# Patient Record
Sex: FEMALE | Race: BLACK OR AFRICAN AMERICAN | Employment: UNEMPLOYED | URBAN - METROPOLITAN AREA
[De-identification: names, ages, dates, MRNs, and addresses within clinical notes are randomized per-mention and may not be internally consistent; named-entity substitution may affect disease eponyms.]

---

## 2017-06-02 ENCOUNTER — HOSPITAL ENCOUNTER (EMERGENCY)
Facility: HOSPITAL | Age: 37
Discharge: HOME | End: 2017-06-02
Attending: EMERGENCY MEDICINE
Payer: MEDICAID

## 2017-06-02 VITALS
HEART RATE: 78 BPM | RESPIRATION RATE: 18 BRPM | BODY MASS INDEX: 34.86 KG/M2 | DIASTOLIC BLOOD PRESSURE: 71 MMHG | OXYGEN SATURATION: 98 % | SYSTOLIC BLOOD PRESSURE: 111 MMHG | TEMPERATURE: 98 F | HEIGHT: 68 IN | WEIGHT: 230 LBS

## 2017-06-02 DIAGNOSIS — G43.009 MIGRAINE WITHOUT AURA AND WITHOUT STATUS MIGRAINOSUS, NOT INTRACTABLE: Primary | ICD-10-CM

## 2017-06-02 PROCEDURE — 80076 HEPATIC FUNCTION PANEL: CPT | Performed by: EMERGENCY MEDICINE

## 2017-06-02 PROCEDURE — 96375 TX/PRO/DX INJ NEW DRUG ADDON: CPT

## 2017-06-02 PROCEDURE — 82962 GLUCOSE BLOOD TEST: CPT

## 2017-06-02 PROCEDURE — 96374 THER/PROPH/DIAG INJ IV PUSH: CPT

## 2017-06-02 PROCEDURE — 84443 ASSAY THYROID STIM HORMONE: CPT | Performed by: EMERGENCY MEDICINE

## 2017-06-02 PROCEDURE — 83690 ASSAY OF LIPASE: CPT | Performed by: EMERGENCY MEDICINE

## 2017-06-02 PROCEDURE — 80048 BASIC METABOLIC PNL TOTAL CA: CPT | Performed by: EMERGENCY MEDICINE

## 2017-06-02 PROCEDURE — 81003 URINALYSIS AUTO W/O SCOPE: CPT | Performed by: EMERGENCY MEDICINE

## 2017-06-02 PROCEDURE — 85025 COMPLETE CBC W/AUTO DIFF WBC: CPT | Performed by: EMERGENCY MEDICINE

## 2017-06-02 PROCEDURE — 81025 URINE PREGNANCY TEST: CPT

## 2017-06-02 PROCEDURE — 96361 HYDRATE IV INFUSION ADD-ON: CPT

## 2017-06-02 PROCEDURE — 99284 EMERGENCY DEPT VISIT MOD MDM: CPT

## 2017-06-02 RX ORDER — METOCLOPRAMIDE HYDROCHLORIDE 5 MG/ML
10 INJECTION INTRAMUSCULAR; INTRAVENOUS ONCE
Status: COMPLETED | OUTPATIENT
Start: 2017-06-02 | End: 2017-06-02

## 2017-06-02 RX ORDER — KETOROLAC TROMETHAMINE 30 MG/ML
30 INJECTION, SOLUTION INTRAMUSCULAR; INTRAVENOUS ONCE
Status: COMPLETED | OUTPATIENT
Start: 2017-06-02 | End: 2017-06-02

## 2017-06-02 RX ORDER — DIPHENHYDRAMINE HYDROCHLORIDE 50 MG/ML
25 INJECTION INTRAMUSCULAR; INTRAVENOUS ONCE
Status: COMPLETED | OUTPATIENT
Start: 2017-06-02 | End: 2017-06-02

## 2017-06-02 RX ORDER — ONDANSETRON 4 MG/1
4 TABLET, ORALLY DISINTEGRATING ORAL EVERY 4 HOURS PRN
Qty: 10 TABLET | Refills: 0 | Status: SHIPPED | OUTPATIENT
Start: 2017-06-02 | End: 2017-06-09

## 2017-06-02 NOTE — ED INITIAL ASSESSMENT (HPI)
C/o \"bad\" headaches x 1 month with fatigue, nausea and vomiting. Sts she feels bloated as well. At times has numbness and tingling to bilateral feet. Here today because nausea continues.

## 2017-06-02 NOTE — ED PROVIDER NOTES
Patient Seen in: [facility] AND [facility] Emergency Department    History   Patient presents with:  Nausea/Vomiting/Diarrhea (gastrointestinal)    Stated Complaint: nvd    HPI    29year old female with h/o pre-diabetes, h/o ovarian cyst, h/o migraines, depress Resp 06/02/17 0954 20   Temp 06/02/17 0954 98.3 °F (36.8 °C)   Temp src 06/02/17 0954 Oral   SpO2 06/02/17 0954 99 %   O2 Device 06/02/17 1046 None (Room air)       Current:/71 mmHg  Pulse 78  Temp(Src) 98.3 °F (36.8 °C) (Oral)  Resp 18  Ht 172.7 c URINALYSIS WITH CULTURE REFLEX - Abnormal; Notable for the following:     Clarity Urine Hazy (*)     Ascorbic Acid Urine 40  (*)     RBC URINE 5 (*)     All other components within normal limits   BASIC METABOLIC PANEL (8) - Abnormal; Notable for the fol her to f/u outpatient for further testing due to her family history. Pt given return precautions, otherwise well appearing.        Disposition and Plan     Clinical Impression:  Migraine without aura and without status migrainosus, not intractable  (primary

## 2017-12-12 ENCOUNTER — OFFICE VISIT (OUTPATIENT)
Dept: OBGYN CLINIC | Facility: CLINIC | Age: 37
End: 2017-12-12

## 2017-12-12 ENCOUNTER — APPOINTMENT (OUTPATIENT)
Dept: LAB | Facility: REFERENCE LAB | Age: 37
End: 2017-12-12
Attending: OBSTETRICS & GYNECOLOGY
Payer: MEDICAID

## 2017-12-12 VITALS
WEIGHT: 242 LBS | HEIGHT: 68 IN | BODY MASS INDEX: 36.68 KG/M2 | DIASTOLIC BLOOD PRESSURE: 70 MMHG | SYSTOLIC BLOOD PRESSURE: 106 MMHG

## 2017-12-12 DIAGNOSIS — F32.A DEPRESSION, UNSPECIFIED DEPRESSION TYPE: ICD-10-CM

## 2017-12-12 DIAGNOSIS — Z01.419 ENCOUNTER FOR GYNECOLOGICAL EXAMINATION WITHOUT ABNORMAL FINDING: Primary | ICD-10-CM

## 2017-12-12 PROCEDURE — 85027 COMPLETE CBC AUTOMATED: CPT | Performed by: OBSTETRICS & GYNECOLOGY

## 2017-12-12 PROCEDURE — 36415 COLL VENOUS BLD VENIPUNCTURE: CPT | Performed by: OBSTETRICS & GYNECOLOGY

## 2017-12-12 PROCEDURE — 84443 ASSAY THYROID STIM HORMONE: CPT | Performed by: OBSTETRICS & GYNECOLOGY

## 2017-12-12 PROCEDURE — 80053 COMPREHEN METABOLIC PANEL: CPT | Performed by: OBSTETRICS & GYNECOLOGY

## 2017-12-12 PROCEDURE — 85652 RBC SED RATE AUTOMATED: CPT | Performed by: OBSTETRICS & GYNECOLOGY

## 2017-12-12 PROCEDURE — 99385 PREV VISIT NEW AGE 18-39: CPT | Performed by: OBSTETRICS & GYNECOLOGY

## 2017-12-12 RX ORDER — SERTRALINE HYDROCHLORIDE 100 MG/1
TABLET, FILM COATED ORAL
Refills: 3 | COMMUNITY
Start: 2017-12-06 | End: 2018-07-25

## 2017-12-12 RX ORDER — SUMATRIPTAN 50 MG/1
TABLET, FILM COATED ORAL
Refills: 2 | COMMUNITY
Start: 2017-12-07 | End: 2018-07-25

## 2017-12-12 RX ORDER — CLONAZEPAM 0.5 MG/1
TABLET ORAL
Refills: 0 | COMMUNITY
Start: 2017-12-08 | End: 2018-07-25

## 2017-12-12 NOTE — PROGRESS NOTES
GYN H&P     2017  2:51 PM    CC: Patient is here for annual and IUD removal.     HPI: Patient is a 40year old  for above. She is not sexually active and would like Mirena removed.     + h/o recurrent boils in her vulvar area (chronic).  None c • Diabetes Father    • High Blood Pressure Father    • No Known Problems Sister    • No Known Problems Brother    • Glaucoma Maternal Grandmother    • No Known Problems Maternal Grandfather    • No Known Problems Paternal Grandmother    • No Known Proble day  2. Feeling down, depressed, or hopeless: Nearly every day  3. Trouble falling or staying asleep, or sleeping too much: Nearly every day  4. Feeling tired or having little energy: Nearly every day  5. Poor appetite or overeating: Nearly every day  6.  Shaheen Kirk Depression, unspecified depression type  - TSH W REFLEX TO FREE T4; Future  - CBC, PLATELET; NO DIFFERENTIAL; Future  - SED RATE, WESTERGREN (AUTOMATED)  - COMP METABOLIC PANEL (14);  Future  - [facility] - INTERNAL    I dw pt at length that

## 2017-12-14 PROBLEM — R87.612 PAPANICOLAOU SMEAR OF CERVIX WITH LOW GRADE SQUAMOUS INTRAEPITHELIAL LESION (LGSIL): Status: ACTIVE | Noted: 2017-12-14

## 2017-12-14 PROBLEM — F32.A DEPRESSION: Status: ACTIVE | Noted: 2017-12-14

## 2018-01-11 ENCOUNTER — OFFICE VISIT (OUTPATIENT)
Dept: OBGYN CLINIC | Facility: CLINIC | Age: 38
End: 2018-01-11

## 2018-01-11 VITALS — WEIGHT: 241 LBS | BODY MASS INDEX: 36.53 KG/M2 | HEIGHT: 68 IN

## 2018-01-11 DIAGNOSIS — R87.612 PAPANICOLAOU SMEAR OF CERVIX WITH LOW GRADE SQUAMOUS INTRAEPITHELIAL LESION (LGSIL): Primary | ICD-10-CM

## 2018-01-11 DIAGNOSIS — N76.0 BACTERIAL VAGINOSIS: ICD-10-CM

## 2018-01-11 DIAGNOSIS — B96.89 BACTERIAL VAGINOSIS: ICD-10-CM

## 2018-01-11 LAB
CONTROL LINE PRESENT WITH A CLEAR BACKGROUND (YES/NO): YES YES/NO
KIT EXPIRATION DATE: NORMAL DATE
PREGNANCY TEST, URINE: NEGATIVE

## 2018-01-11 PROCEDURE — 81025 URINE PREGNANCY TEST: CPT | Performed by: OBSTETRICS & GYNECOLOGY

## 2018-01-11 PROCEDURE — 57456 ENDOCERV CURETTAGE W/SCOPE: CPT | Performed by: OBSTETRICS & GYNECOLOGY

## 2018-01-11 PROCEDURE — 88305 TISSUE EXAM BY PATHOLOGIST: CPT | Performed by: OBSTETRICS & GYNECOLOGY

## 2018-01-11 RX ORDER — METRONIDAZOLE 500 MG/1
500 TABLET ORAL 2 TIMES DAILY
Qty: 14 TABLET | Refills: 0 | Status: SHIPPED | OUTPATIENT
Start: 2018-01-11 | End: 2018-01-18

## 2018-01-16 ENCOUNTER — CHARTING TRANS (OUTPATIENT)
Dept: OTHER | Age: 38
End: 2018-01-16

## 2018-07-25 ENCOUNTER — APPOINTMENT (OUTPATIENT)
Dept: LAB | Facility: REFERENCE LAB | Age: 38
End: 2018-07-25
Attending: FAMILY MEDICINE
Payer: COMMERCIAL

## 2018-07-25 DIAGNOSIS — E55.9 VITAMIN D DEFICIENCY: ICD-10-CM

## 2018-07-25 DIAGNOSIS — E53.8 VITAMIN B12 DEFICIENCY: ICD-10-CM

## 2018-07-25 PROBLEM — G43.009 MIGRAINE WITHOUT AURA: Status: ACTIVE | Noted: 2018-07-25

## 2018-07-25 PROBLEM — B35.1 ONYCHOMYCOSIS: Status: ACTIVE | Noted: 2018-07-25

## 2018-07-25 PROBLEM — F41.9 ANXIETY: Status: ACTIVE | Noted: 2018-07-25

## 2018-07-25 PROBLEM — F32.2 MODERATELY SEVERE MAJOR DEPRESSION (HCC): Status: ACTIVE | Noted: 2018-07-25

## 2018-07-25 LAB — VIT B12 SERPL-MCNC: 1212 PG/ML (ref 181–914)

## 2018-07-25 PROCEDURE — 82607 VITAMIN B-12: CPT | Performed by: FAMILY MEDICINE

## 2018-07-25 PROCEDURE — 82306 VITAMIN D 25 HYDROXY: CPT | Performed by: FAMILY MEDICINE

## 2018-07-25 PROCEDURE — 36415 COLL VENOUS BLD VENIPUNCTURE: CPT | Performed by: FAMILY MEDICINE

## 2018-07-25 NOTE — PATIENT INSTRUCTIONS
Preventing Migraine Headaches: Triggers  The first step in preventing migraines is to learn what triggers them. You may then be able to control your triggers to avoid or reduce the severity of your migraines.   Know your triggers  Be aware that you may ha © 8062-3733 [address]. [address] [facility] [place]. All rights reserved. This information is not intended as a substitute for professional medical care. Always follow your healthcare professional's instructions.

## 2018-07-25 NOTE — PROGRESS NOTES
CC:  Patient presents with:  Establish Care  Lab: vitamin d def and to check vitamin levels  Fungus Nails: left big toe fell off and on her right middle finger  Referral: for counseling for depression  Migraine: discuss options for treatment  Referral: PT with no radicular symptoms   Psych: depression and anxiety but no mercedez, no SI or HI     Past Medical History:   Diagnosis Date   • Depression     most of her life, no suicidal ideation.  No psychiatrist   • Migraine    • Ovarian cyst     2009 was evaluated muscular tenderness to palpation. Full range of neck motion with negative Spurling sign. Negative straight leg raise testing and normal strength bilateral upper and lower extremities.  Normal sensation throughout  Psych: normal mood and affect  Neuro: CN II anxiety/depression or sooner as needed.      Skye Dinh DO  07/25/18  4:27 PM

## 2018-07-27 LAB — 25(OH)D3 SERPL-MCNC: 24.4 NG/ML

## 2018-07-28 DIAGNOSIS — E55.9 VITAMIN D DEFICIENCY: Primary | ICD-10-CM

## 2018-08-08 ENCOUNTER — LAB ENCOUNTER (OUTPATIENT)
Dept: LAB | Facility: REFERENCE LAB | Age: 38
End: 2018-08-08
Attending: FAMILY MEDICINE
Payer: COMMERCIAL

## 2018-08-08 ENCOUNTER — OFFICE VISIT (OUTPATIENT)
Dept: FAMILY MEDICINE CLINIC | Facility: CLINIC | Age: 38
End: 2018-08-08
Payer: COMMERCIAL

## 2018-08-08 VITALS
BODY MASS INDEX: 36.53 KG/M2 | WEIGHT: 241 LBS | HEART RATE: 73 BPM | SYSTOLIC BLOOD PRESSURE: 118 MMHG | DIASTOLIC BLOOD PRESSURE: 72 MMHG | HEIGHT: 68 IN | OXYGEN SATURATION: 98 %

## 2018-08-08 DIAGNOSIS — F32.2 MODERATELY SEVERE MAJOR DEPRESSION (HCC): ICD-10-CM

## 2018-08-08 DIAGNOSIS — Z00.01 ENCOUNTER FOR ROUTINE ADULT HEALTH EXAMINATION WITH ABNORMAL FINDINGS: Primary | ICD-10-CM

## 2018-08-08 DIAGNOSIS — E55.9 VITAMIN D DEFICIENCY: ICD-10-CM

## 2018-08-08 DIAGNOSIS — Z30.432 ENCOUNTER FOR REMOVAL OF INTRAUTERINE CONTRACEPTIVE DEVICE: ICD-10-CM

## 2018-08-08 DIAGNOSIS — B36.0 TINEA VERSICOLOR: ICD-10-CM

## 2018-08-08 DIAGNOSIS — F41.9 ANXIETY: ICD-10-CM

## 2018-08-08 DIAGNOSIS — Z00.01 ENCOUNTER FOR ROUTINE ADULT HEALTH EXAMINATION WITH ABNORMAL FINDINGS: ICD-10-CM

## 2018-08-08 DIAGNOSIS — Z11.3 VENEREAL DISEASE SCREENING: ICD-10-CM

## 2018-08-08 DIAGNOSIS — Z80.3 FAMILY HISTORY OF BREAST CANCER: ICD-10-CM

## 2018-08-08 PROBLEM — F32.A DEPRESSION: Status: RESOLVED | Noted: 2017-12-14 | Resolved: 2018-08-08

## 2018-08-08 LAB
CHOLEST SERPL-MCNC: 158 MG/DL (ref 110–200)
HDLC SERPL-MCNC: 48 MG/DL
LDLC SERPL CALC-MCNC: 98 MG/DL (ref 0–99)
NONHDLC SERPL-MCNC: 110 MG/DL
TRIGL SERPL-MCNC: 59 MG/DL (ref 1–149)

## 2018-08-08 PROCEDURE — 87491 CHLMYD TRACH DNA AMP PROBE: CPT | Performed by: FAMILY MEDICINE

## 2018-08-08 PROCEDURE — 87389 HIV-1 AG W/HIV-1&-2 AB AG IA: CPT | Performed by: FAMILY MEDICINE

## 2018-08-08 PROCEDURE — 83036 HEMOGLOBIN GLYCOSYLATED A1C: CPT | Performed by: FAMILY MEDICINE

## 2018-08-08 PROCEDURE — 87591 N.GONORRHOEAE DNA AMP PROB: CPT | Performed by: FAMILY MEDICINE

## 2018-08-08 PROCEDURE — 36415 COLL VENOUS BLD VENIPUNCTURE: CPT | Performed by: FAMILY MEDICINE

## 2018-08-08 PROCEDURE — 80061 LIPID PANEL: CPT | Performed by: FAMILY MEDICINE

## 2018-08-08 PROCEDURE — 99395 PREV VISIT EST AGE 18-39: CPT | Performed by: FAMILY MEDICINE

## 2018-08-08 PROCEDURE — 86780 TREPONEMA PALLIDUM: CPT | Performed by: FAMILY MEDICINE

## 2018-08-08 PROCEDURE — 58301 REMOVE INTRAUTERINE DEVICE: CPT | Performed by: FAMILY MEDICINE

## 2018-08-08 RX ORDER — SELENIUM SULFIDE 2.5 MG/100ML
1 LOTION TOPICAL WEEKLY
Qty: 150 ML | Refills: 3 | Status: SHIPPED | OUTPATIENT
Start: 2018-08-08 | End: 2020-04-14 | Stop reason: ALTCHOICE

## 2018-08-08 RX ORDER — KETOCONAZOLE 20 MG/G
1 CREAM TOPICAL 2 TIMES DAILY
Qty: 60 G | Refills: 2 | Status: SHIPPED | OUTPATIENT
Start: 2018-08-08 | End: 2019-11-21 | Stop reason: ALTCHOICE

## 2018-08-08 RX ORDER — ESCITALOPRAM OXALATE 10 MG/1
10 TABLET ORAL DAILY
Qty: 30 TABLET | Refills: 0 | Status: SHIPPED | OUTPATIENT
Start: 2018-08-08 | End: 2018-09-07

## 2018-08-08 NOTE — PATIENT INSTRUCTIONS
Prevention Guidelines, Women Ages 25 to 44  Screening tests and vaccines are an important part of managing your health. A screening test is done to find possible disorders or diseases in people who don't have any symptoms.  The goal is to find a disease e Type 2 diabetes All women with prediabetes Every year   Gonorrhea Sexually active women at increased risk for infection At routine exams   Hepatitis C Anyone at increased risk At routine exams   HIV All women should be tested at least once for HIV between Meningococcal Women at increased risk for infection should talk with their healthcare provider 1 or more doses   Pneumococcal conjugate vaccine (PCV13) and pneumococcal polysaccharide vaccine (PPSV23) Women at increased risk for infection should talk with © 5493-6784 [address]. [address] [facility], [facility] [place]. All rights reserved. This information is not intended as a substitute for professional medical care. Always follow your healthcare professional's instructions.         Natacha ESTRELLA This fungus can come back again (recur) after treatment. To prevent return of the rash, use medicated dandruff shampoo over your whole body when in the shower. Do this once a month for the next year. This is very important to do in the summertime.  That is

## 2018-08-08 NOTE — PROGRESS NOTES
HPI:   Vonda Royal is a 40year old female who presents for a complete physical exam.   Patient presents with:  Physical  Medication Follow-Up: states the medication she was prescribe did not make a difference  IUD: Requesting removal    Has not noticed ketoconazole 2 % External Cream Apply 1 Application topically 2 (two) times daily. Disp: 60 g Rfl: 2   selenium sulfide 2.5 % External Lotion Apply 1 Application topically once a week. Apply from neck to knees and wash off after 24 hours.  Disp: 150 mL Rf kg/m².   /72   Pulse 73   Ht 68\"   Wt 241 lb   SpO2 98%   Breastfeeding?  No   BMI 36.64 kg/m²     GENERAL: well developed, well nourished, in no apparent distress   SKIN: hypopigmented macules on chest and neck with scaling but no excoriations of ce note, will use condoms for now and decide on what future birth control she prefers. Referral to genetic specialist at [place] [address] for possible BRCA testing given strong family history of breast cancer.      Discussed with patient the following:  -Monthly hilda

## 2018-08-09 PROBLEM — B36.0 TINEA VERSICOLOR: Status: ACTIVE | Noted: 2018-08-09

## 2018-08-09 LAB
C TRACH DNA SPEC QL NAA+PROBE: NEGATIVE
HBA1C MFR BLD: 5.9 % (ref 4–6)
HIV1+2 AB SERPL QL IA: NONREACTIVE
N GONORRHOEA DNA SPEC QL NAA+PROBE: NEGATIVE

## 2018-08-09 NOTE — PROCEDURES
IUD Removal   Consent signed. Procedure discussed with the patient in detail including indication, risks, benefits, alternatives and complications. Procedure:  Speculum placed in the vagina. An Endocervical speculum was used to visualize strings.   Rin

## 2018-08-10 LAB — T PALLIDUM AB SER QL: NEGATIVE

## 2018-08-13 DIAGNOSIS — R73.09 ELEVATED HEMOGLOBIN A1C: Primary | ICD-10-CM

## 2018-09-08 ENCOUNTER — PATIENT MESSAGE (OUTPATIENT)
Dept: FAMILY MEDICINE CLINIC | Facility: CLINIC | Age: 38
End: 2018-09-08

## 2018-09-08 RX ORDER — CHOLECALCIFEROL (VITAMIN D3) 1250 MCG
CAPSULE ORAL
Qty: 8 CAPSULE | Refills: 0 | OUTPATIENT
Start: 2018-09-08

## 2018-09-08 RX ORDER — ESCITALOPRAM OXALATE 10 MG/1
TABLET ORAL
Qty: 30 TABLET | Refills: 0 | Status: SHIPPED | OUTPATIENT
Start: 2018-09-08 | End: 2018-10-17

## 2018-09-10 NOTE — TELEPHONE ENCOUNTER
From: Saba Norris,   To: Jennifer London  Sent: 9/8/2018 12:36 PM CDT  Subject: Vitamin D    Jono Reina,    I did not refill your Vitamin D medication as I first want you to come back to repeat your Vitamin D level and make sure your number is now normal. If

## 2018-09-11 ENCOUNTER — TELEPHONE (OUTPATIENT)
Dept: FAMILY MEDICINE CLINIC | Facility: CLINIC | Age: 38
End: 2018-09-11

## 2018-09-11 NOTE — TELEPHONE ENCOUNTER
Let pt know that Dr. Hany Mcgee dictated a letter for her and we can not e-mail it. Per pt, we can mail it. Sent out today.

## 2018-10-17 RX ORDER — ESCITALOPRAM OXALATE 10 MG/1
TABLET ORAL
Qty: 90 TABLET | Refills: 0 | Status: SHIPPED | OUTPATIENT
Start: 2018-10-17 | End: 2018-12-03

## 2019-01-07 ENCOUNTER — TELEPHONE (OUTPATIENT)
Dept: FAMILY MEDICINE CLINIC | Facility: CLINIC | Age: 39
End: 2019-01-07

## 2019-01-07 NOTE — TELEPHONE ENCOUNTER
Addendum to note from 1/7/19:  Per pharmacist, Clonazepam was sent in by Dr. Ellis Mcwilliams today who per pt, she hasn't seen \"in forever\" and is currently out of stock. They have it ordered for pt. Pt also is currently out of work without any health insurance.

## 2019-03-09 RX ORDER — ESCITALOPRAM OXALATE 20 MG/1
TABLET ORAL
Qty: 90 TABLET | Refills: 0 | Status: SHIPPED | OUTPATIENT
Start: 2019-03-09 | End: 2019-05-01 | Stop reason: ALTCHOICE

## 2019-03-09 NOTE — TELEPHONE ENCOUNTER
A refill request was received for:  Requested Prescriptions     Pending Prescriptions Disp Refills   • ESCITALOPRAM 20 MG Oral Tab [Pharmacy Med Name: ESCITALOPRAM 20MG TABLETS] 90 tablet 0     Sig: TAKE 1 TABLET(20 MG) BY MOUTH DAILY     Last refill date:

## 2019-05-01 NOTE — PROGRESS NOTES
CC:  Patient presents with:  Medication Follow-Up: states the hydroxizine doesnt help much for her sleep, and lexapro has helped very little       HPI: 41year old female here to follow-up on medication for anxiety and depression.   Has been taking Lexapro Highest education level: Not on file    Occupational History      Occupation: Customer service    Social Needs      Financial resource strain: Not on file      Food insecurity:        Worry: Not on file        Inability: Not on file      Transportation nee how often have you   been bothered by the following problems? Not at all Several days More than half the days Nearly every day Total Score   1. Feeling nervous, anxious or on edge 0 1 2 3 1   2. Not being able to stop or control worrying 0 1 2 3 2   3.  Wor kg/m².     Physical:  General:  Alert, appropriate, no acute distress   HEENT: supple, no tonsillar erythema or exudate, no lymphadenopathy, no thyroid enlargement   Cardio:  RRR, no murmurs, S1, S2  Pulmonary:  Clear bilaterally, good air entry  Psych: nor

## 2019-05-01 NOTE — PATIENT INSTRUCTIONS
-Take Escitalopram 10 mg (cut pill in half) daily for 3 days, then take it every other day for 4 days, then stop  -After stopping Escitalopram, do not take your Escitalopram or Fluoxetine for 1 day, then start new Fluoxetine 10 mg daily for 7 days  -Once t

## 2019-05-03 ENCOUNTER — TELEPHONE (OUTPATIENT)
Dept: OBGYN CLINIC | Facility: CLINIC | Age: 39
End: 2019-05-03

## 2019-05-06 ENCOUNTER — TELEPHONE (OUTPATIENT)
Dept: FAMILY MEDICINE CLINIC | Facility: CLINIC | Age: 39
End: 2019-05-06

## 2019-05-06 NOTE — TELEPHONE ENCOUNTER
Pt rescheduled [facility] visit. Pt needs prior authorization for medication Fluoxetine. This RN will route to M. Severo Obey MA.

## 2019-05-14 ENCOUNTER — OFFICE VISIT (OUTPATIENT)
Dept: OBGYN CLINIC | Facility: CLINIC | Age: 39
End: 2019-05-14
Payer: MEDICAID

## 2019-05-14 VITALS
HEIGHT: 68 IN | DIASTOLIC BLOOD PRESSURE: 80 MMHG | WEIGHT: 257.38 LBS | BODY MASS INDEX: 39.01 KG/M2 | SYSTOLIC BLOOD PRESSURE: 112 MMHG

## 2019-05-14 DIAGNOSIS — Z01.419 ENCOUNTER FOR GYNECOLOGICAL EXAMINATION WITHOUT ABNORMAL FINDING: Primary | ICD-10-CM

## 2019-05-14 RX ORDER — FLUOXETINE HYDROCHLORIDE 20 MG/1
CAPSULE ORAL
Qty: 90 CAPSULE | Refills: 0 | Status: SHIPPED | OUTPATIENT
Start: 2019-05-14 | End: 2019-07-25

## 2019-05-14 RX ORDER — FLUOXETINE 10 MG/1
10 CAPSULE ORAL DAILY
Qty: 7 CAPSULE | Refills: 0 | Status: SHIPPED | OUTPATIENT
Start: 2019-05-14 | End: 2019-06-14

## 2019-05-30 RX ORDER — TRAZODONE HYDROCHLORIDE 50 MG/1
TABLET ORAL
Qty: 30 TABLET | Refills: 0 | Status: SHIPPED | OUTPATIENT
Start: 2019-05-30 | End: 2019-07-25

## 2019-05-30 NOTE — TELEPHONE ENCOUNTER
Pt calling scheduled appointment with Dr. Adarsh Grimm, asking if we can refill trazadone. Telephone Encounter by Ashanti Payan NCMA at 07/17/18 01:22 PM     Author:  Ashanti Payan NCMA Service:  (none) Author Type:  Certified Medical Assistant     Filed:  07/17/18 01:23 PM Encounter Date:  7/17/2018 Status:  Signed     :  Ashanti Payan NCMA (Certified Medical Assistant)            Spoke with patient. Scheduled to see Rossy Sourav 7/20/18 at 3:00pm. [facility].[JF1.1M]       Revision History        User Key Date/Time User Provider Type Action    > JF1.1 07/17/18 01:23 PM Ashanti Payan NCMA Certified Medical Assistant Sign    M - Manual

## 2019-05-30 NOTE — TELEPHONE ENCOUNTER
Refill request:    traZODone HCl 50 MG Oral Tab 30 tablet 0 5/1/2019    Sig:   Take 1 tablet (50 mg total) by mouth nightly.        Next appt 6/19/2019    LOV 5/1/2019

## 2019-06-14 ENCOUNTER — APPOINTMENT (OUTPATIENT)
Dept: LAB | Facility: REFERENCE LAB | Age: 39
End: 2019-06-14
Attending: OBSTETRICS & GYNECOLOGY
Payer: COMMERCIAL

## 2019-06-14 ENCOUNTER — OFFICE VISIT (OUTPATIENT)
Dept: OBGYN CLINIC | Facility: CLINIC | Age: 39
End: 2019-06-14
Payer: MEDICAID

## 2019-06-14 VITALS
WEIGHT: 249 LBS | DIASTOLIC BLOOD PRESSURE: 70 MMHG | SYSTOLIC BLOOD PRESSURE: 106 MMHG | HEIGHT: 68 IN | BODY MASS INDEX: 37.74 KG/M2

## 2019-06-14 DIAGNOSIS — Z01.419 ENCOUNTER FOR GYNECOLOGICAL EXAMINATION WITHOUT ABNORMAL FINDING: ICD-10-CM

## 2019-06-14 DIAGNOSIS — Z30.09 FAMILY PLANNING: ICD-10-CM

## 2019-06-14 DIAGNOSIS — R87.612 PAPANICOLAOU SMEAR OF CERVIX WITH LOW GRADE SQUAMOUS INTRAEPITHELIAL LESION (LGSIL): ICD-10-CM

## 2019-06-14 DIAGNOSIS — Z01.419 ENCOUNTER FOR GYNECOLOGICAL EXAMINATION WITHOUT ABNORMAL FINDING: Primary | ICD-10-CM

## 2019-06-14 DIAGNOSIS — L68.0 HIRSUTISM: ICD-10-CM

## 2019-06-14 DIAGNOSIS — Z80.3 FAMILY HISTORY OF BREAST CANCER: ICD-10-CM

## 2019-06-14 PROCEDURE — 84443 ASSAY THYROID STIM HORMONE: CPT

## 2019-06-14 PROCEDURE — 82627 DEHYDROEPIANDROSTERONE: CPT

## 2019-06-14 PROCEDURE — 84402 ASSAY OF FREE TESTOSTERONE: CPT

## 2019-06-14 PROCEDURE — 83498 ASY HYDROXYPROGESTERONE 17-D: CPT | Performed by: OBSTETRICS & GYNECOLOGY

## 2019-06-14 PROCEDURE — 99395 PREV VISIT EST AGE 18-39: CPT | Performed by: OBSTETRICS & GYNECOLOGY

## 2019-06-14 PROCEDURE — 87591 N.GONORRHOEAE DNA AMP PROB: CPT | Performed by: OBSTETRICS & GYNECOLOGY

## 2019-06-14 PROCEDURE — 84403 ASSAY OF TOTAL TESTOSTERONE: CPT

## 2019-06-14 PROCEDURE — 87624 HPV HI-RISK TYP POOLED RSLT: CPT | Performed by: OBSTETRICS & GYNECOLOGY

## 2019-06-14 PROCEDURE — 88175 CYTOPATH C/V AUTO FLUID REDO: CPT | Performed by: OBSTETRICS & GYNECOLOGY

## 2019-06-14 PROCEDURE — 87491 CHLMYD TRACH DNA AMP PROBE: CPT | Performed by: OBSTETRICS & GYNECOLOGY

## 2019-06-14 PROCEDURE — 36415 COLL VENOUS BLD VENIPUNCTURE: CPT

## 2019-06-14 NOTE — PROGRESS NOTES
GYN H&P     2019  9:08 AM    CC: Patient is here for annual.     HPI: Patient is a 41year old  for annual. She has noticed for the past year that she has had increasing hair under chin     She had IUD removed after 7 years.  She is not currentl Blood Pressure Father    • No Known Problems Sister    • No Known Problems Brother    • Glaucoma Maternal Grandmother    • No Known Problems Maternal Grandfather    • No Known Problems Paternal Grandmother    • No Known Problems Paternal Grandfather    • B masses, no discharge  Vagina: normal pink mucosa, no lesions, normal clear discharge.    Uterus: midline, mobile, non-tender, firm and smooth  Cervix: pink, no lesions grossly visible, no discharge  Adnexa: non tender, no palpable masses, normal size  Anus:

## 2019-06-20 NOTE — PROGRESS NOTES
Reviewed results with pt and scheduled for Thursday 8/1 for colpo. Due to provider and pt's schedules this was the earliest time pt could come in.  This RN will review with LC that it is okay for pt to wait this long, otherwise she will look into requestin

## 2019-07-23 ENCOUNTER — APPOINTMENT (OUTPATIENT)
Dept: GENERAL RADIOLOGY | Facility: HOSPITAL | Age: 39
End: 2019-07-23
Attending: EMERGENCY MEDICINE
Payer: COMMERCIAL

## 2019-07-23 ENCOUNTER — HOSPITAL ENCOUNTER (EMERGENCY)
Facility: HOSPITAL | Age: 39
Discharge: HOME | End: 2019-07-24
Attending: EMERGENCY MEDICINE
Payer: COMMERCIAL

## 2019-07-23 DIAGNOSIS — S16.1XXA STRAIN OF NECK MUSCLE, INITIAL ENCOUNTER: Primary | ICD-10-CM

## 2019-07-23 DIAGNOSIS — T07.XXXA MULTIPLE CONTUSIONS: ICD-10-CM
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
## 2019-07-23 PROCEDURE — 99284 EMERGENCY DEPT VISIT MOD MDM: CPT

## 2019-07-23 PROCEDURE — 71046 X-RAY EXAM CHEST 2 VIEWS: CPT | Performed by: EMERGENCY MEDICINE

## 2019-07-23 PROCEDURE — 72040 X-RAY EXAM NECK SPINE 2-3 VW: CPT | Performed by: EMERGENCY MEDICINE

## 2019-07-23 RX ORDER — IBUPROFEN 600 MG/1
600 TABLET ORAL ONCE
Status: COMPLETED | OUTPATIENT
Start: 2019-07-23 | End: 2019-07-23

## 2019-07-24 VITALS
WEIGHT: 248.88 LBS | DIASTOLIC BLOOD PRESSURE: 79 MMHG | RESPIRATION RATE: 18 BRPM | BODY MASS INDEX: 37.72 KG/M2 | OXYGEN SATURATION: 96 % | TEMPERATURE: 99 F | HEART RATE: 64 BPM | SYSTOLIC BLOOD PRESSURE: 140 MMHG | HEIGHT: 68 IN
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
## 2019-07-25 ENCOUNTER — TELEPHONE (OUTPATIENT)
Dept: FAMILY MEDICINE CLINIC | Facility: CLINIC | Age: 39
End: 2019-07-25

## 2019-07-25 ENCOUNTER — OFFICE VISIT (OUTPATIENT)
Dept: FAMILY MEDICINE CLINIC | Facility: CLINIC | Age: 39
End: 2019-07-25
Payer: MEDICAID

## 2019-07-25 VITALS
DIASTOLIC BLOOD PRESSURE: 70 MMHG | HEIGHT: 68 IN | HEART RATE: 87 BPM | WEIGHT: 247.63 LBS | BODY MASS INDEX: 37.53 KG/M2 | OXYGEN SATURATION: 98 % | SYSTOLIC BLOOD PRESSURE: 110 MMHG
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
## 2019-07-25 DIAGNOSIS — F33.41 RECURRENT MAJOR DEPRESSIVE DISORDER, IN PARTIAL REMISSION (HCC): ICD-10-CM

## 2019-07-25 DIAGNOSIS — M25.511 ACUTE PAIN OF RIGHT SHOULDER: ICD-10-CM

## 2019-07-25 DIAGNOSIS — F41.9 ANXIETY: ICD-10-CM

## 2019-07-25 DIAGNOSIS — S16.1XXD STRAIN OF NECK MUSCLE, SUBSEQUENT ENCOUNTER: ICD-10-CM

## 2019-07-25 PROCEDURE — 99214 OFFICE O/P EST MOD 30 MIN: CPT | Performed by: FAMILY MEDICINE

## 2019-07-25 RX ORDER — DICLOFENAC SODIUM 75 MG/1
75 TABLET, DELAYED RELEASE ORAL 2 TIMES DAILY PRN
COMMUNITY
Start: 2019-06-20 | End: 2019-11-21 | Stop reason: ALTCHOICE

## 2019-07-25 RX ORDER — FLUOXETINE HYDROCHLORIDE 20 MG/1
CAPSULE ORAL
Qty: 90 CAPSULE | Refills: 1 | Status: SHIPPED | OUTPATIENT
Start: 2019-07-25 | End: 2019-07-25

## 2019-07-25 RX ORDER — FAMOTIDINE 20 MG
1 TABLET ORAL DAILY
COMMUNITY
End: 2020-05-11 | Stop reason: ALTCHOICE

## 2019-07-25 RX ORDER — NAPROXEN 500 MG/1
500 TABLET ORAL 2 TIMES DAILY WITH MEALS
Qty: 28 TABLET | Refills: 0 | Status: SHIPPED | OUTPATIENT
Start: 2019-07-25 | End: 2019-08-15

## 2019-07-25 RX ORDER — TRAZODONE HYDROCHLORIDE 50 MG/1
TABLET ORAL
Qty: 90 TABLET | Refills: 0 | Status: SHIPPED | OUTPATIENT
Start: 2019-07-25 | End: 2020-04-14

## 2019-07-25 RX ORDER — FLUOXETINE HYDROCHLORIDE 20 MG/1
CAPSULE ORAL
Qty: 90 CAPSULE | Refills: 1 | Status: SHIPPED | OUTPATIENT
Start: 2019-07-25 | End: 2019-08-22

## 2019-07-25 NOTE — TELEPHONE ENCOUNTER
[facility] calling to clarify an order for Fluoxetine. The Order states:    FLUoxetine HCl 20 MG Oral Cap 90 capsule 1 7/25/2019    Sig:   Take 10 mg by mouth daily and then increase to 20 mg capsules.        Per AS note from today:  \"Doing very
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
## 2019-07-25 NOTE — PATIENT INSTRUCTIONS
Understanding Cervical Strain    There are 7 bones (vertebrae) in the neck that are part of the spine. These are called the cervical spine. Cervical strain is a medical term for neck pain. The neck has several layers of muscles.  These are connected with · New symptoms  Date Last Reviewed: 3/10/2016  © 3364-9151 [address]. 1407 [facility], [facility]2 [place]. All rights reserved. This information is not intended as a substitute for professional medical care.  Always follow your healthc

## 2019-07-30 ENCOUNTER — TELEPHONE (OUTPATIENT)
Dept: OBGYN CLINIC | Facility: CLINIC | Age: 39
End: 2019-07-30

## 2019-08-01 ENCOUNTER — TELEPHONE (OUTPATIENT)
Dept: ORTHOPEDICS CLINIC | Facility: CLINIC | Age: 39
End: 2019-08-01

## 2019-08-01 ENCOUNTER — HOSPITAL ENCOUNTER (OUTPATIENT)
Age: 39
Discharge: HOME | End: 2019-08-01
Attending: FAMILY MEDICINE
Payer: COMMERCIAL

## 2019-08-01 ENCOUNTER — OFFICE VISIT (OUTPATIENT)
Dept: OBGYN CLINIC | Facility: CLINIC | Age: 39
End: 2019-08-01
Payer: MEDICAID

## 2019-08-01 ENCOUNTER — APPOINTMENT (OUTPATIENT)
Dept: GENERAL RADIOLOGY | Age: 39
End: 2019-08-01
Attending: FAMILY MEDICINE
Payer: COMMERCIAL

## 2019-08-01 ENCOUNTER — TELEPHONE (OUTPATIENT)
Dept: FAMILY MEDICINE CLINIC | Facility: CLINIC | Age: 39
End: 2019-08-01

## 2019-08-01 VITALS
HEIGHT: 68 IN | DIASTOLIC BLOOD PRESSURE: 80 MMHG | WEIGHT: 249 LBS | SYSTOLIC BLOOD PRESSURE: 116 MMHG | BODY MASS INDEX: 37.74 KG/M2

## 2019-08-01 VITALS
SYSTOLIC BLOOD PRESSURE: 137 MMHG | RESPIRATION RATE: 18 BRPM | HEART RATE: 80 BPM | BODY MASS INDEX: 36.37 KG/M2 | TEMPERATURE: 99 F | HEIGHT: 68 IN | DIASTOLIC BLOOD PRESSURE: 86 MMHG | WEIGHT: 240 LBS | OXYGEN SATURATION: 100 %

## 2019-08-01 DIAGNOSIS — R42 DIZZINESS DUE TO OLD HEAD TRAUMA: Primary | ICD-10-CM

## 2019-08-01 DIAGNOSIS — M25.461 EFFUSION OF RIGHT KNEE: Primary | ICD-10-CM

## 2019-08-01 DIAGNOSIS — R87.612 PAPANICOLAOU SMEAR OF CERVIX WITH LOW GRADE SQUAMOUS INTRAEPITHELIAL LESION (LGSIL): Primary | ICD-10-CM

## 2019-08-01 DIAGNOSIS — Z80.3 FAMILY HISTORY OF BREAST CANCER: ICD-10-CM

## 2019-08-01 DIAGNOSIS — R42 DIZZINESS: ICD-10-CM

## 2019-08-01 DIAGNOSIS — S09.90XS DIZZINESS DUE TO OLD HEAD TRAUMA: Primary | ICD-10-CM

## 2019-08-01 PROCEDURE — 88305 TISSUE EXAM BY PATHOLOGIST: CPT | Performed by: OBSTETRICS & GYNECOLOGY

## 2019-08-01 PROCEDURE — 57454 BX/CURETT OF CERVIX W/SCOPE: CPT | Performed by: OBSTETRICS & GYNECOLOGY

## 2019-08-01 PROCEDURE — 99213 OFFICE O/P EST LOW 20 MIN: CPT

## 2019-08-01 PROCEDURE — 73562 X-RAY EXAM OF KNEE 3: CPT | Performed by: FAMILY MEDICINE
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
## 2019-08-01 NOTE — TELEPHONE ENCOUNTER
Dr. Tracey Vidal, this pt was seen in [facility] today and xrays were taken. Based on xrays and IC exam, can this pt wait to be seen by Hayley Choe on 08/09/19? He has a full schedule on 08/06/19 and you are out of the office next week.  Do you recommend pt going to
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
## 2019-08-01 NOTE — TELEPHONE ENCOUNTER
AS will put in stat MRI order; pt will call [facility] scheduling to schedule test; pt to call back tomorrow morning with date and time of test so that [facility] ER referral specialist can get it authorized. ER precautions reviewed with pt.  Pt verbalized unders
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
## 2019-08-01 NOTE — TELEPHONE ENCOUNTER
It can wait, but if she wants to be seen soon, can call [facility] and get an appointment, provided she has insurance we accept.

## 2019-08-02 ENCOUNTER — TELEPHONE (OUTPATIENT)
Dept: FAMILY MEDICINE CLINIC | Facility: CLINIC | Age: 39
End: 2019-08-02

## 2019-08-02 NOTE — TELEPHONE ENCOUNTER
Called emani to get stat prior auth on MRI Brain w/ wo contrast summited additional clinicals as prior auth was sent to medical review, asked to do a peer to peer and was told that current status is in initial determination and is waiting on medical Dr lyon

## 2019-08-02 NOTE — TELEPHONE ENCOUNTER
Pt feeling the same; still feeling \"whoozie\". ER precautions reviewed with pt if symptoms persist, change, get worse. Pt verbalized understanding and agrees with POC. Pt to be updated on MRI order when we hear back from insurance.

## 2019-08-02 NOTE — TELEPHONE ENCOUNTER
Leticia Giraldo Son, [place]      8/2/19 11:54 AM   Note      Called emani to get stat prior auth on MRI Brain w/ wo contrast summited additional clinicals as prior auth was sent to medical review, asked to do a peer to peer and was told that current status is in i

## 2019-08-02 NOTE — TELEPHONE ENCOUNTER
STAT MRI placed (ok per AS) as pt states she can't get MRI done today except in [place] which is too far for pt. Pt aware that it needs to be authorized by pt's insurance. Pt to be called once approved. Pt verbalized understanding and agrees with POC.

## 2019-08-04 ENCOUNTER — HOSPITAL ENCOUNTER (OUTPATIENT)
Dept: MRI IMAGING | Age: 39
Discharge: HOME | End: 2019-08-04
Attending: FAMILY MEDICINE
Payer: COMMERCIAL

## 2019-08-04 ENCOUNTER — PATIENT MESSAGE (OUTPATIENT)
Dept: FAMILY MEDICINE CLINIC | Facility: CLINIC | Age: 39
End: 2019-08-04

## 2019-08-04 DIAGNOSIS — R42 DIZZINESS DUE TO OLD HEAD TRAUMA: ICD-10-CM

## 2019-08-04 DIAGNOSIS — S09.90XS DIZZINESS DUE TO OLD HEAD TRAUMA: ICD-10-CM

## 2019-08-04 DIAGNOSIS — S06.0X0D CONCUSSION WITHOUT LOSS OF CONSCIOUSNESS, SUBSEQUENT ENCOUNTER: Primary | ICD-10-CM

## 2019-08-04 PROCEDURE — 70553 MRI BRAIN STEM W/O & W/DYE: CPT | Performed by: FAMILY MEDICINE

## 2019-08-04 PROCEDURE — A9575 INJ GADOTERATE MEGLUMI 0.1ML: HCPCS | Performed by: FAMILY MEDICINE

## 2019-08-05 ENCOUNTER — TELEPHONE (OUTPATIENT)
Dept: PHYSICAL THERAPY | Facility: HOSPITAL | Age: 39
End: 2019-08-05
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
## 2019-08-06 ENCOUNTER — TELEPHONE (OUTPATIENT)
Dept: PHYSICAL THERAPY | Facility: HOSPITAL | Age: 39
End: 2019-08-06

## 2019-08-07 ENCOUNTER — TELEPHONE (OUTPATIENT)
Dept: OBGYN CLINIC | Facility: CLINIC | Age: 39
End: 2019-08-07

## 2019-08-07 NOTE — PROGRESS NOTES
Maryalice Siemens, MD  P [facility] Clinical Staff         Called and dw pt these results. 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 DW pt procedure and told her that triage nurse would call her to schedule this.

## 2019-08-08 ENCOUNTER — OFFICE VISIT (OUTPATIENT)
Dept: PHYSICAL THERAPY | Facility: HOSPITAL | Age: 39
End: 2019-08-08
Attending: FAMILY MEDICINE
Payer: COMMERCIAL

## 2019-08-08 DIAGNOSIS — S06.0X0D CONCUSSION WITHOUT LOSS OF CONSCIOUSNESS, SUBSEQUENT ENCOUNTER: ICD-10-CM

## 2019-08-08 PROCEDURE — 97163 PT EVAL HIGH COMPLEX 45 MIN: CPT
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
## 2019-08-15 ENCOUNTER — OFFICE VISIT (OUTPATIENT)
Dept: PHYSICAL THERAPY | Facility: HOSPITAL | Age: 39
End: 2019-08-15
Attending: FAMILY MEDICINE
Payer: COMMERCIAL

## 2019-08-15 PROCEDURE — 97530 THERAPEUTIC ACTIVITIES: CPT

## 2019-08-15 PROCEDURE — 97112 NEUROMUSCULAR REEDUCATION: CPT

## 2019-08-15 RX ORDER — NAPROXEN 500 MG/1
TABLET ORAL
Qty: 28 TABLET | Refills: 0 | Status: SHIPPED | OUTPATIENT
Start: 2019-08-15 | End: 2019-11-21 | Stop reason: ALTCHOICE

## 2019-08-15 NOTE — PATIENT INSTRUCTIONS
Do these 2 times each day    1. Walk and turn your head side to side to look at the walls as if window shopping. Turn head every 4-5 steps and try to maintain a straight path. Do 10 head turns. Repeat with up and down head turns.     2. Stand tall with t
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
## 2019-08-15 NOTE — TELEPHONE ENCOUNTER
A refill request was received for:  Requested Prescriptions     Pending Prescriptions Disp Refills   • NAPROXEN 500 MG Oral Tab [Pharmacy Med Name: NAPROXEN 500MG TABLETS] 28 tablet 0     Sig: TAKE 1 TABLET(500 MG) BY MOUTH TWICE DAILY WITH MEALS FOR 14 DA

## 2019-08-19 ENCOUNTER — TELEPHONE (OUTPATIENT)
Dept: PHYSICAL THERAPY | Facility: HOSPITAL | Age: 39
End: 2019-08-19

## 2019-08-21 ENCOUNTER — OFFICE VISIT (OUTPATIENT)
Dept: PHYSICAL THERAPY | Facility: HOSPITAL | Age: 39
End: 2019-08-21
Attending: FAMILY MEDICINE
Payer: COMMERCIAL

## 2019-08-21 PROCEDURE — 97530 THERAPEUTIC ACTIVITIES: CPT

## 2019-08-21 PROCEDURE — 97112 NEUROMUSCULAR REEDUCATION: CPT
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
## 2019-08-28 ENCOUNTER — OFFICE VISIT (OUTPATIENT)
Dept: PHYSICAL THERAPY | Facility: HOSPITAL | Age: 39
End: 2019-08-28
Attending: FAMILY MEDICINE
Payer: COMMERCIAL

## 2019-08-28 PROCEDURE — 97112 NEUROMUSCULAR REEDUCATION: CPT
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
## 2019-08-28 NOTE — PATIENT INSTRUCTIONS
Do these 2 times each day    1. Walk and turn your head side to side to look at the walls as if window shopping. Turn head every 4-5 steps and try to maintain a straight path. Do 10 head turns. Repeat with up and down head turns.     2. Stand tall with t or in front of your bed for safety. Stand tall, arms at sides. Turn head and shoulders to the right, looking over your shoulder. Return to the center and repeat to the left. Do 5 times each direction.

## 2019-08-30 ENCOUNTER — APPOINTMENT (OUTPATIENT)
Dept: PHYSICAL THERAPY | Facility: HOSPITAL | Age: 39
End: 2019-08-30
Attending: FAMILY MEDICINE
Payer: COMMERCIAL

## 2019-09-04 ENCOUNTER — TELEPHONE (OUTPATIENT)
Dept: PHYSICAL THERAPY | Facility: HOSPITAL | Age: 39
End: 2019-09-04

## 2019-09-05 ENCOUNTER — OFFICE VISIT (OUTPATIENT)
Dept: OBGYN CLINIC | Facility: CLINIC | Age: 39
End: 2019-09-05
Payer: COMMERCIAL

## 2019-09-05 VITALS — BODY MASS INDEX: 36 KG/M2 | HEIGHT: 68 IN

## 2019-09-05 DIAGNOSIS — R87.612 PAPANICOLAOU SMEAR OF CERVIX WITH LOW GRADE SQUAMOUS INTRAEPITHELIAL LESION (LGSIL): Primary | ICD-10-CM

## 2019-09-05 LAB
CONTROL LINE PRESENT WITH A CLEAR BACKGROUND (YES/NO): YES YES/NO
KIT LOT #: NORMAL NUMERIC
PREGNANCY TEST, URINE: NEGATIVE

## 2019-09-05 PROCEDURE — 88305 TISSUE EXAM BY PATHOLOGIST: CPT | Performed by: OBSTETRICS & GYNECOLOGY

## 2019-09-05 PROCEDURE — 88307 TISSUE EXAM BY PATHOLOGIST: CPT | Performed by: OBSTETRICS & GYNECOLOGY

## 2019-09-05 PROCEDURE — 81025 URINE PREGNANCY TEST: CPT | Performed by: OBSTETRICS & GYNECOLOGY

## 2019-09-05 PROCEDURE — 57522 CONIZATION OF CERVIX: CPT | Performed by: OBSTETRICS & GYNECOLOGY

## 2019-09-05 NOTE — PROGRESS NOTES
Pap = LGSIL    Colpo bx = LGSIL (persistent)    Here for LEEP. I dw pt the pathology results, how the LEEP is performed, and to expect some light vaginal bleeding and lower abdominal cramping.  I discussed the risks of the procedure including bleeding, inf

## 2019-09-06 ENCOUNTER — OFFICE VISIT (OUTPATIENT)
Dept: UROLOGY | Facility: HOSPITAL | Age: 39
End: 2019-09-06
Attending: OBSTETRICS & GYNECOLOGY
Payer: COMMERCIAL

## 2019-09-06 VITALS
SYSTOLIC BLOOD PRESSURE: 112 MMHG | HEIGHT: 68 IN | WEIGHT: 240 LBS | BODY MASS INDEX: 36.37 KG/M2 | DIASTOLIC BLOOD PRESSURE: 80 MMHG

## 2019-09-06 DIAGNOSIS — R35.1 NOCTURIA: ICD-10-CM

## 2019-09-06 DIAGNOSIS — N39.44 NOCTURNAL ENURESIS: Primary | ICD-10-CM

## 2019-09-06 DIAGNOSIS — N39.41 URGE INCONTINENCE: ICD-10-CM

## 2019-09-06 DIAGNOSIS — N39.3 FEMALE STRESS INCONTINENCE: ICD-10-CM

## 2019-09-06 PROCEDURE — 99201 HC OUTPT EVAL AND MGNT NEW PT LEVEL 1: CPT

## 2019-09-06 NOTE — PROGRESS NOTES
Aurora Munoz,   9/6/2019     Referred by Dr. Shavonne Napier  Pt here with self    Patient presents with:   Incontinence      HPI:  +RAIN, some  Some UUI  Reports nocturia x3  H/o nocturnal enuresis    Denies prolapse  Not sexually active, n/a dyspareunia  Constip Allergies    Medications:    Outpatient Medications Prior to Visit:  FLUoxetine HCl 40 MG Oral Cap Take one capsule by mouth daily.  Disp: 90 capsule Rfl: 0   NAPROXEN 500 MG Oral Tab TAKE 1 TABLET(500 MG) BY MOUTH TWICE DAILY WITH MEALS FOR 14 DAYS Disp: 2 +mobile  Adnexa:no masses, nontender  Perineum: nontender  Anus: wnl  Rectum: defer    PELVIS FLOOR NEUROMUSCULAR FUNCTION:  Strength:  1, Unable to hold greater than 3 sec and Increased tone  Perineal Sensation:  Normal      PELVIC SUPPORT:  [place]:  0  Ant

## 2019-09-10 ENCOUNTER — APPOINTMENT (OUTPATIENT)
Dept: PHYSICAL THERAPY | Facility: HOSPITAL | Age: 39
End: 2019-09-10
Attending: FAMILY MEDICINE
Payer: COMMERCIAL

## 2019-09-12 ENCOUNTER — APPOINTMENT (OUTPATIENT)
Dept: PHYSICAL THERAPY | Facility: HOSPITAL | Age: 39
End: 2019-09-12
Attending: FAMILY MEDICINE
Payer: COMMERCIAL

## 2019-09-17 ENCOUNTER — OFFICE VISIT (OUTPATIENT)
Dept: PHYSICAL THERAPY | Facility: HOSPITAL | Age: 39
End: 2019-09-17
Attending: FAMILY MEDICINE
Payer: COMMERCIAL

## 2019-09-17 PROCEDURE — 97112 NEUROMUSCULAR REEDUCATION: CPT

## 2019-09-17 NOTE — PROGRESS NOTES
Discharge Summary    Referring Physician: Dr Mariela Cox    Diagnosis:  Post-concussion    Pt has attended 5 visits in Physical Therapy. Subjective: Pt reports being asymptomatic. Has not experienced dizziness or headaches. Feels back to baseline.  Able to yuval

## 2019-09-23 ENCOUNTER — APPOINTMENT (OUTPATIENT)
Dept: PHYSICAL THERAPY | Facility: HOSPITAL | Age: 39
End: 2019-09-23
Attending: FAMILY MEDICINE
Payer: COMMERCIAL

## 2019-09-26 ENCOUNTER — TELEPHONE (OUTPATIENT)
Dept: OBGYN CLINIC | Facility: CLINIC | Age: 39
End: 2019-09-26

## 2019-09-30 ENCOUNTER — APPOINTMENT (OUTPATIENT)
Dept: PHYSICAL THERAPY | Facility: HOSPITAL | Age: 39
End: 2019-09-30
Attending: FAMILY MEDICINE
Payer: COMMERCIAL

## 2019-10-08 ENCOUNTER — APPOINTMENT (OUTPATIENT)
Dept: PHYSICAL THERAPY | Facility: HOSPITAL | Age: 39
End: 2019-10-08
Attending: FAMILY MEDICINE
Payer: COMMERCIAL

## 2019-10-29 RX ORDER — FLUOXETINE HYDROCHLORIDE 40 MG/1
CAPSULE ORAL
Qty: 90 CAPSULE | Refills: 0 | Status: SHIPPED | OUTPATIENT
Start: 2019-10-29 | End: 2019-11-21

## 2019-10-29 NOTE — TELEPHONE ENCOUNTER
A refill request was received for:  Requested Prescriptions     Pending Prescriptions Disp Refills   • FLUoxetine HCl 40 MG Oral Cap 90 capsule 0     Sig: Take one capsule by mouth daily.      Last refill date: 08/22/19  Qty:90  Last office visit: 05/01/19

## 2019-11-21 NOTE — PROGRESS NOTES
CC:  Patient presents with:  Medication Follow-Up: fluoxetine seems to help but not enough      HPI: 46year old female here to follow-up on treatment of anxiety and depression with Fluoxetine.    Increased her Fluoxetine to 40 mg daily in August when work file      Transportation needs:        Medical: Not on file        Non-medical: Not on file    Tobacco Use      Smoking status: Former Smoker        Quit date: 2014        Years since quittin.9      Smokeless tobacco: Never Used    Substance and control worrying 0 1 2 3 1   3. Worrying too much about different things 0 1 2 3 1   4. Trouble relaxing 0 1 2 3 1   5. Being so restless that it is hard to sit still 0 1 2 3 0   6. Becoming easily annoyed or irritable 0 1 2 3 2   7.  Feeling afraid as if s lesions  Psych: normal mood and affect     Assessment and Plan: 46year old female here to follow-up on depression and anxiety.     1. Recurrent major depressive disorder, in partial remission ([facility] [address]    - Overall improved with PHQ9 score of 6/27, but feels s

## 2019-11-25 ENCOUNTER — OFFICE VISIT (OUTPATIENT)
Dept: OBGYN CLINIC | Facility: CLINIC | Age: 39
End: 2019-11-25
Payer: COMMERCIAL

## 2019-11-25 VITALS
SYSTOLIC BLOOD PRESSURE: 116 MMHG | DIASTOLIC BLOOD PRESSURE: 80 MMHG | HEIGHT: 68 IN | BODY MASS INDEX: 37.89 KG/M2 | WEIGHT: 250 LBS

## 2019-11-25 DIAGNOSIS — R87.612 PAPANICOLAOU SMEAR OF CERVIX WITH LOW GRADE SQUAMOUS INTRAEPITHELIAL LESION (LGSIL): Primary | ICD-10-CM

## 2019-11-25 PROCEDURE — 99024 POSTOP FOLLOW-UP VISIT: CPT | Performed by: OBSTETRICS & GYNECOLOGY

## 2019-11-25 NOTE — PROGRESS NOTES
Here for FU after [facility] for persistent alfred 1. No complaints    SPEC: well healed cone base. Plan FU pap 4/2020.

## 2020-03-20 RX ORDER — FLUOXETINE HYDROCHLORIDE 60 MG/1
TABLET, FILM COATED ORAL; ORAL
Qty: 90 TABLET | Refills: 0 | Status: SHIPPED | OUTPATIENT
Start: 2020-03-20 | End: 2020-04-14

## 2020-03-20 NOTE — TELEPHONE ENCOUNTER
A refill request was received for:  Requested Prescriptions     Pending Prescriptions Disp Refills   • FLUOXETINE HCL 60 MG Oral Tab [Pharmacy Med Name: FLUOXETINE 60MG TABLETS] 90 tablet 0     Sig: TAKE 1 TABLET BY MOUTH DAILY     Last refill date: 11/21/

## 2020-04-13 ENCOUNTER — PATIENT MESSAGE (OUTPATIENT)
Dept: FAMILY MEDICINE CLINIC | Facility: CLINIC | Age: 40
End: 2020-04-13

## 2020-04-14 PROBLEM — F33.2 SEVERE EPISODE OF RECURRENT MAJOR DEPRESSIVE DISORDER, WITHOUT PSYCHOTIC FEATURES (HCC): Status: ACTIVE | Noted: 2018-07-25

## 2020-04-14 NOTE — PATIENT INSTRUCTIONS
Depression  Depression is one of the most common mental health problems today. It is not just a state of unhappiness or sadness. It is a true disease. The cause seems to be related to a decrease in chemicals that transmit signals in the brain.  Having a f · Don't drink alcohol, which can make depression worse. · Take medicine as prescribed.   · Tell each of your healthcare providers about all of the prescription and over-the-counter medicines, vitamins, and supplements you take. Certain supplements interact

## 2020-04-14 NOTE — PROGRESS NOTES
CC:  Patient presents with: Anxiety      HPI: 46year old female here for video visit for anxiety and depression follow-up. Has been at home for the past 6 weeks as she was not happy with the conditions at her job at [employer].   Per her mother, she has been resource strain: Not on file      Food insecurity:        Worry: Not on file        Inability: Not on file      Transportation needs:        Medical: Not on file        Non-medical: Not on file    Tobacco Use      Smoking status: Former Smoker        Quit day Total Score   1. Feeling nervous, anxious or on edge 0 1 2 3 3   2. Not being able to stop or control worrying 0 1 2 3 3   3. Worrying too much about different things 0 1 2 3 3   4. Trouble relaxing 0 1 2 3 3   5.  Being so restless that it is hard to s has greatly increased to 23/27, but denies any active suicidal plans or attempts  - Provided with [facility] crisis line and also referred to [facility] to provide CBT resources  - Discussed switching to alternative medication for depression and anxiety

## 2020-04-14 NOTE — TELEPHONE ENCOUNTER
Called pt and also needs anxiety meds, provided appointment for today at 1630. Also pt has  13 days of Fluoxetine hcl 60mg po left and need rx to be sent to [facility] Rx mail in order. Unable to find specific company therefore encouraged to send picture.  Pt

## 2020-04-17 ENCOUNTER — TELEPHONE (OUTPATIENT)
Dept: FAMILY MEDICINE CLINIC | Facility: CLINIC | Age: 40
End: 2020-04-17

## 2020-04-17 NOTE — TELEPHONE ENCOUNTER
Khushi calling from [facility] requesting an call back to discuss patient.

## 2020-04-20 NOTE — TELEPHONE ENCOUNTER
Received following message from [facility]:   \"The patient was assessed on 4/17/20 and deemed appropriate for Partial Hospitalization Program - [address] states she would like to start program, but would like to first contact her insurance c

## 2020-05-11 PROBLEM — F41.1 GENERALIZED ANXIETY DISORDER: Status: ACTIVE | Noted: 2020-05-11

## 2020-05-11 PROBLEM — F41.0 PANIC ATTACK: Status: ACTIVE | Noted: 2020-05-11

## 2020-06-09 RX ORDER — BUPROPION HYDROCHLORIDE 150 MG/1
150 TABLET ORAL EVERY MORNING
Qty: 90 TABLET | Refills: 1 | Status: SHIPPED | OUTPATIENT
Start: 2020-06-09 | End: 2021-01-27

## 2020-06-09 RX ORDER — TRAZODONE HYDROCHLORIDE 100 MG/1
100 TABLET ORAL NIGHTLY PRN
Qty: 90 TABLET | Refills: 1 | Status: SHIPPED | OUTPATIENT
Start: 2020-06-09 | End: 2021-01-29 | Stop reason: ALTCHOICE

## 2020-06-09 RX ORDER — FLUOXETINE 20 MG/1
TABLET, FILM COATED ORAL
Qty: 90 TABLET | Refills: 1 | Status: SHIPPED | OUTPATIENT
Start: 2020-06-09 | End: 2020-08-24

## 2020-06-09 RX ORDER — FLUOXETINE HYDROCHLORIDE 60 MG/1
TABLET, FILM COATED ORAL; ORAL
Qty: 90 TABLET | Refills: 1 | Status: SHIPPED | OUTPATIENT
Start: 2020-06-09 | End: 2020-08-24

## 2020-06-09 NOTE — TELEPHONE ENCOUNTER
A refill request was received for:  Requested Prescriptions     Pending Prescriptions Disp Refills   • FLUoxetine HCl 20 MG Oral Tab [Pharmacy Med Name: FLUOXETINE  20MG  TAB] 90 tablet 0     Sig: TAKE 1 TABLET BY MOUTH  DAILY WITH 60MG TABLET   • FLUoxeti

## 2020-07-27 ENCOUNTER — PATIENT MESSAGE (OUTPATIENT)
Dept: FAMILY MEDICINE CLINIC | Facility: CLINIC | Age: 40
End: 2020-07-27

## 2020-07-28 NOTE — TELEPHONE ENCOUNTER
From: [place]  To: Marcelo Lucas DO  Sent: 7/27/2020 7:45 PM CDT  Subject: Other    Helraul Hillman,     Returning to work had been a bit overwhelming for me with balancing my daily life such as self care, being stressed, and under severe anxiety.  I wa

## 2020-08-17 NOTE — PROGRESS NOTES
HPI:    Patient ID: Ana M Berman is a 46year old female who presents for referral for psychiatry. HPI  Was seen at [facility] for 2.5 weeks. Wasn't able to do the whole 4 weeks b/c insurance would not cover. Completed end of May.    Taking f change and fatigue. Negative for activity change. HENT: Negative. Eyes: Negative. Respiratory: Negative. Cardiovascular: Negative. Neurological: Negative. Psychiatric/Behavioral: Positive for sleep disturbance and depressed mood.  Negative

## 2020-08-20 NOTE — TELEPHONE ENCOUNTER
A refill request was received for:  Requested Prescriptions     Pending Prescriptions Disp Refills   • FLUoxetine HCl 20 MG Oral Tab 90 tablet 1     Sig: TAKE 1 TABLET BY MOUTH  DAILY WITH 60MG TABLET   • FLUoxetine HCl 60 MG Oral Tab 90 tablet 1     Sig:

## 2020-08-24 RX ORDER — FLUOXETINE 20 MG/1
TABLET, FILM COATED ORAL
Qty: 90 TABLET | Refills: 1 | Status: SHIPPED | OUTPATIENT
Start: 2020-08-24 | End: 2021-02-05

## 2020-08-24 RX ORDER — FLUOXETINE HYDROCHLORIDE 60 MG/1
TABLET, FILM COATED ORAL; ORAL
Qty: 90 TABLET | Refills: 1 | Status: SHIPPED | OUTPATIENT
Start: 2020-08-24 | End: 2021-02-05

## 2020-08-25 ENCOUNTER — OFFICE VISIT (OUTPATIENT)
Dept: OBGYN CLINIC | Facility: CLINIC | Age: 40
End: 2020-08-25
Payer: COMMERCIAL

## 2020-08-25 VITALS
SYSTOLIC BLOOD PRESSURE: 118 MMHG | HEIGHT: 68 IN | WEIGHT: 231 LBS | DIASTOLIC BLOOD PRESSURE: 70 MMHG | BODY MASS INDEX: 35.01 KG/M2

## 2020-08-25 DIAGNOSIS — Z80.3 FAMILY HISTORY OF BREAST CANCER: ICD-10-CM

## 2020-08-25 DIAGNOSIS — Z30.09 FAMILY PLANNING: ICD-10-CM

## 2020-08-25 DIAGNOSIS — R87.612 PAPANICOLAOU SMEAR OF CERVIX WITH LOW GRADE SQUAMOUS INTRAEPITHELIAL LESION (LGSIL): ICD-10-CM

## 2020-08-25 DIAGNOSIS — Z01.419 ENCOUNTER FOR GYNECOLOGICAL EXAMINATION WITHOUT ABNORMAL FINDING: Primary | ICD-10-CM

## 2020-08-25 PROCEDURE — 87491 CHLMYD TRACH DNA AMP PROBE: CPT | Performed by: OBSTETRICS & GYNECOLOGY

## 2020-08-25 PROCEDURE — 3078F DIAST BP <80 MM HG: CPT | Performed by: OBSTETRICS & GYNECOLOGY

## 2020-08-25 PROCEDURE — 87624 HPV HI-RISK TYP POOLED RSLT: CPT | Performed by: OBSTETRICS & GYNECOLOGY

## 2020-08-25 PROCEDURE — 3074F SYST BP LT 130 MM HG: CPT | Performed by: OBSTETRICS & GYNECOLOGY

## 2020-08-25 PROCEDURE — 3008F BODY MASS INDEX DOCD: CPT | Performed by: OBSTETRICS & GYNECOLOGY

## 2020-08-25 PROCEDURE — 87591 N.GONORRHOEAE DNA AMP PROB: CPT | Performed by: OBSTETRICS & GYNECOLOGY

## 2020-08-25 PROCEDURE — 99395 PREV VISIT EST AGE 18-39: CPT | Performed by: OBSTETRICS & GYNECOLOGY

## 2020-08-25 RX ORDER — MISOPROSTOL 200 UG/1
TABLET ORAL
Qty: 2 TABLET | Refills: 0 | Status: SHIPPED | OUTPATIENT
Start: 2020-08-25 | End: 2020-08-26

## 2020-08-25 NOTE — PROGRESS NOTES
HPI:    Patient ID: Brittney Anderson is a 46year old female who presents for disability paperwork. HPI  Has appt with psychiatry in October. Cannot sleep without the trazodone, but makes her groggy the next day.    Typically getting 6 hours of sleep per Negative. Neurological: Negative. Psychiatric/Behavioral: Positive for sleep disturbance and depressed mood. Negative for suicidal ideas and self-injury. The patient is nervous/anxious.             /70   Pulse 80   Temp 99.2 °F (37.3 °C) (Oral)

## 2020-08-25 NOTE — PROGRESS NOTES
CC: Patient is here for FU pap    HPI: Patient is a 46year old  for FU pap after 2019 LEEP for alfred 1. Also c/o internal vaginal irritation for 1 day. No discharge. No recent ABX. No new sex partner    She would like IUD.  She previously had Mirena Onset   • Schizophrenia Mother    • Breast Cancer Mother 58   • Ulcerative Colitis Mother    • Diabetes Father    • High Blood Pressure Father    • No Known Problems Sister    • No Known Problems Brother    • Glaucoma Maternal Grandmother    • No Known Pro partner violence:        Fear of current or ex partner: Not on file        Emotionally abused: Not on file        Physically abused: Not on file        Forced sexual activity: Not on file    Other Topics      Concerns:         Service: Not Asked Future  - [facility] 2D+3D SCREENING BILAT (CPT=77067/61238); Future    4. Family planning    Pap, gc/chl sent. Patient to return for Mirena IUD placement after next period. I dw pt use, SE and insertion. Plan to use cytotec prior to the insertion.      Wri

## 2020-08-25 NOTE — PATIENT INSTRUCTIONS
Please come in for Mirena IUD placement within 10 days of the 1st day you bleed. You should use the cytotec the night prior to placement.

## 2020-08-26 ENCOUNTER — TELEPHONE (OUTPATIENT)
Dept: OBGYN CLINIC | Facility: CLINIC | Age: 40
End: 2020-08-26

## 2020-08-26 LAB
C TRACH DNA SPEC QL NAA+PROBE: NEGATIVE
N GONORRHOEA DNA SPEC QL NAA+PROBE: NEGATIVE

## 2020-08-26 RX ORDER — FLUOXETINE HYDROCHLORIDE 20 MG/1
20 CAPSULE ORAL DAILY
Qty: 90 CAPSULE | Refills: 0 | Status: SHIPPED | OUTPATIENT
Start: 2020-08-26 | End: 2021-01-07

## 2020-08-27 DIAGNOSIS — N76.0 VAGINITIS AND VULVOVAGINITIS: Primary | ICD-10-CM

## 2020-08-27 LAB — HPV I/H RISK 1 DNA SPEC QL NAA+PROBE: NEGATIVE

## 2020-08-27 RX ORDER — METRONIDAZOLE 500 MG/1
500 TABLET ORAL 2 TIMES DAILY
Qty: 14 TABLET | Refills: 0 | Status: SHIPPED | OUTPATIENT
Start: 2020-08-27 | End: 2020-09-03

## 2020-08-28 NOTE — PROGRESS NOTES
Pt aware Gonorrhea and chlamydia testing negative. Pap is normal, HPV negative. Follow up pap in 3 years. Pap showed bacterial vaginosis, a prescription for flagyl was sent to pharmacy to use twice daily for 7 days.  Pt aware to avoid alcohol while using th

## 2021-01-05 ENCOUNTER — TELEPHONE (OUTPATIENT)
Dept: FAMILY MEDICINE CLINIC | Facility: CLINIC | Age: 41
End: 2021-01-05

## 2021-01-05 NOTE — TELEPHONE ENCOUNTER
RN reached out to pt. RN informed pt of AS's recommendations below. \"Needs to be seen in an ER with these symptoms as she may need IV antibiotics or a repeat scan with possibility of perforation. I would recommend she go to one there as I would not want her to fly home with these symptoms. \"    Pt agrees with poc, states she will go back to ER today.

## 2021-01-05 NOTE — TELEPHONE ENCOUNTER
Needs to be seen in an ER with these symptoms as she may need IV antibiotics or a repeat scan with possibility of perforation. I would recommend she go to one there as I would not want her to fly home with these symptoms.

## 2021-01-06 ENCOUNTER — HOSPITAL ENCOUNTER (EMERGENCY)
Facility: HOSPITAL | Age: 41
Discharge: HOME | End: 2021-01-07
Attending: EMERGENCY MEDICINE
Payer: MEDICAID

## 2021-01-06 DIAGNOSIS — R11.0 NAUSEA: ICD-10-CM

## 2021-01-06 DIAGNOSIS — E87.6 HYPOKALEMIA: Primary | ICD-10-CM

## 2021-01-06 DIAGNOSIS — R74.8 ELEVATED LIVER ENZYMES: ICD-10-CM

## 2021-01-06 LAB
B-HCG UR QL: NEGATIVE
BASOPHILS # BLD AUTO: 0.05 X10(3) UL (ref 0–0.2)
BASOPHILS NFR BLD AUTO: 0.4 %
DEPRECATED RDW RBC AUTO: 39.4 FL (ref 35.1–46.3)
EOSINOPHIL # BLD AUTO: 0.16 X10(3) UL (ref 0–0.7)
EOSINOPHIL NFR BLD AUTO: 1.1 %
ERYTHROCYTE [DISTWIDTH] IN BLOOD BY AUTOMATED COUNT: 12.9 % (ref 11–15)
HCT VFR BLD AUTO: 45.9 %
HGB BLD-MCNC: 15.9 G/DL
IMM GRANULOCYTES # BLD AUTO: 0.03 X10(3) UL (ref 0–1)
IMM GRANULOCYTES NFR BLD: 0.2 %
LYMPHOCYTES # BLD AUTO: 3.56 X10(3) UL (ref 1–4)
LYMPHOCYTES NFR BLD AUTO: 25.5 %
MCH RBC QN AUTO: 29.4 PG (ref 26–34)
MCHC RBC AUTO-ENTMCNC: 34.6 G/DL (ref 31–37)
MCV RBC AUTO: 84.8 FL
MONOCYTES # BLD AUTO: 1.07 X10(3) UL (ref 0.1–1)
MONOCYTES NFR BLD AUTO: 7.7 %
NEUTROPHILS # BLD AUTO: 9.1 X10 (3) UL (ref 1.5–7.7)
NEUTROPHILS # BLD AUTO: 9.1 X10(3) UL (ref 1.5–7.7)
NEUTROPHILS NFR BLD AUTO: 65.1 %
PLATELET # BLD AUTO: 537 10(3)UL (ref 150–450)
RBC # BLD AUTO: 5.41 X10(6)UL
WBC # BLD AUTO: 14 X10(3) UL (ref 4–11)

## 2021-01-06 PROCEDURE — 81025 URINE PREGNANCY TEST: CPT

## 2021-01-06 PROCEDURE — 80048 BASIC METABOLIC PNL TOTAL CA: CPT | Performed by: EMERGENCY MEDICINE

## 2021-01-06 PROCEDURE — 96361 HYDRATE IV INFUSION ADD-ON: CPT

## 2021-01-06 PROCEDURE — 96365 THER/PROPH/DIAG IV INF INIT: CPT

## 2021-01-06 PROCEDURE — 80076 HEPATIC FUNCTION PANEL: CPT | Performed by: EMERGENCY MEDICINE

## 2021-01-06 PROCEDURE — 85025 COMPLETE CBC W/AUTO DIFF WBC: CPT | Performed by: EMERGENCY MEDICINE

## 2021-01-06 PROCEDURE — 83690 ASSAY OF LIPASE: CPT | Performed by: EMERGENCY MEDICINE

## 2021-01-06 PROCEDURE — 99285 EMERGENCY DEPT VISIT HI MDM: CPT

## 2021-01-06 PROCEDURE — 96375 TX/PRO/DX INJ NEW DRUG ADDON: CPT

## 2021-01-06 PROCEDURE — 96366 THER/PROPH/DIAG IV INF ADDON: CPT

## 2021-01-06 RX ORDER — ONDANSETRON 2 MG/ML
4 INJECTION INTRAMUSCULAR; INTRAVENOUS ONCE
Status: COMPLETED | OUTPATIENT
Start: 2021-01-06 | End: 2021-01-06

## 2021-01-07 ENCOUNTER — APPOINTMENT (OUTPATIENT)
Dept: CT IMAGING | Facility: HOSPITAL | Age: 41
End: 2021-01-07
Attending: EMERGENCY MEDICINE
Payer: MEDICAID

## 2021-01-07 ENCOUNTER — TELEPHONE (OUTPATIENT)
Dept: FAMILY MEDICINE CLINIC | Facility: CLINIC | Age: 41
End: 2021-01-07

## 2021-01-07 VITALS
HEIGHT: 68 IN | DIASTOLIC BLOOD PRESSURE: 65 MMHG | RESPIRATION RATE: 20 BRPM | TEMPERATURE: 99 F | BODY MASS INDEX: 34.86 KG/M2 | SYSTOLIC BLOOD PRESSURE: 108 MMHG | HEART RATE: 60 BPM | WEIGHT: 230 LBS | OXYGEN SATURATION: 94 %

## 2021-01-07 DIAGNOSIS — D72.829 LEUKOCYTOSIS, UNSPECIFIED TYPE: ICD-10-CM

## 2021-01-07 DIAGNOSIS — R74.01 TRANSAMINITIS: Primary | ICD-10-CM

## 2021-01-07 LAB
ALBUMIN SERPL-MCNC: 4.4 G/DL (ref 3.4–5)
ALP LIVER SERPL-CCNC: 106 U/L
ALT SERPL-CCNC: 279 U/L
ANION GAP SERPL CALC-SCNC: 9 MMOL/L (ref 0–18)
AST SERPL-CCNC: 142 U/L (ref 15–37)
BILIRUB DIRECT SERPL-MCNC: 0.3 MG/DL (ref 0–0.2)
BILIRUB SERPL-MCNC: 1.2 MG/DL (ref 0.1–2)
BUN BLD-MCNC: 18 MG/DL (ref 7–18)
BUN/CREAT SERPL: 12.6 (ref 10–20)
CALCIUM BLD-MCNC: 9.6 MG/DL (ref 8.5–10.1)
CHLORIDE SERPL-SCNC: 94 MMOL/L (ref 98–112)
CO2 SERPL-SCNC: 29 MMOL/L (ref 21–32)
CREAT BLD-MCNC: 1.43 MG/DL
GLUCOSE BLD-MCNC: 114 MG/DL (ref 70–99)
LIPASE SERPL-CCNC: 120 U/L (ref 73–393)
M PROTEIN MFR SERPL ELPH: 8.6 G/DL (ref 6.4–8.2)
OSMOLALITY SERPL CALC.SUM OF ELEC: 277 MOSM/KG (ref 275–295)
POTASSIUM SERPL-SCNC: 2.7 MMOL/L (ref 3.5–5.1)
SODIUM SERPL-SCNC: 132 MMOL/L (ref 136–145)

## 2021-01-07 PROCEDURE — 74177 CT ABD & PELVIS W/CONTRAST: CPT | Performed by: EMERGENCY MEDICINE

## 2021-01-07 RX ORDER — ONDANSETRON 4 MG/1
4 TABLET, ORALLY DISINTEGRATING ORAL EVERY 8 HOURS PRN
COMMUNITY
Start: 2021-01-02 | End: 2021-01-29 | Stop reason: ALTCHOICE

## 2021-01-07 RX ORDER — IBUPROFEN 600 MG/1
600 TABLET ORAL ONCE
Status: COMPLETED | OUTPATIENT
Start: 2021-01-07 | End: 2021-01-07

## 2021-01-07 NOTE — ED PROVIDER NOTES
Patient Seen in: [facility] AND [facility] Emergency Department      History   Patient presents with:  Nausea    Stated Complaint: nausea    HPI/Subjective:   HPI  45-year-old female with history of ovarian cysts, depression presenting for evaluation of nausea. Social drinker    Drug use: Yes      Types: Cannabis      Comment: daily cannabis- joint             Review of Systems    Positive for stated complaint: nausea  Other systems are as noted in HPI. Constitutional and vital signs reviewed.       All other sys Result Value    Glucose 114 (*)     Sodium 132 (*)     Potassium 2.7 (*)     Chloride 94 (*)     Creatinine 1.43 (*)     GFR, Non- 46 (*)     GFR, -American 53 (*)     All other components within normal limits   HEPATIC FUNCTION PANE CBC on January 2 with WBC count of 14.2. In addition there is hypokalemia of 2.7, will replete with 40 meq K. There is also mild transaminitis. Will obtain CT abdomen pelvis to rule out biliary pathology.     CT abdomen pelvis with possible cholelithiasi

## 2021-01-07 NOTE — PROGRESS NOTES
msg relayed to pt. Pt aware to schedule blood draw. Pt verbalized understanding and agrees with POC.

## 2021-01-07 NOTE — TELEPHONE ENCOUNTER
AS'  msg relayed to pt. Pt verbalized understanding and agrees with POC. AS  Notified.        Serg Granados 10 Dr. Camelia Reeves             Please notify patient I would like her to return in 1 week to repeat her labs and have placed the order (no e

## 2021-01-21 ENCOUNTER — TELEPHONE (OUTPATIENT)
Dept: OBGYN CLINIC | Facility: CLINIC | Age: 41
End: 2021-01-21

## 2021-01-21 RX ORDER — MISOPROSTOL 200 UG/1
TABLET ORAL
Qty: 2 TABLET | Refills: 0 | Status: SHIPPED | OUTPATIENT
Start: 2021-01-21 | End: 2021-01-28

## 2021-01-21 NOTE — TELEPHONE ENCOUNTER
misoprostol 200 MCG Oral Tab 2 tablet 0 8/26/2020     Sig: Place 2 tablets in the vagina the night prior to the procedure.     Sent to pharmacy as: miSOPROStol 200 MCG Oral Tablet (CYTOTEC)      Pt calling states she lost prescription for Misoprostol and wo

## 2021-01-22 ENCOUNTER — PATIENT MESSAGE (OUTPATIENT)
Dept: FAMILY MEDICINE CLINIC | Facility: CLINIC | Age: 41
End: 2021-01-22

## 2021-01-25 NOTE — TELEPHONE ENCOUNTER
LVM to call office ASAP for an appointment with provider. ED VISIT 01/06/2021   Potassium 2.7 Delon Bey RN         1/7/21 11:48 AM  Note     AS'  msg relayed to pt. Pt verbalized understanding and agrees with POC.  AS  Notified.

## 2021-01-26 NOTE — TELEPHONE ENCOUNTER
Chilton Castleman, DO [address] Dr. Adin Lay 12 hours ago (11:26 PM)     Okay to offer SDA. Message text      Called pt and provided SDA for 02/2/2021 with Dr. Carlos Bynum and pt verbalized understanding.

## 2021-01-28 ENCOUNTER — PATIENT MESSAGE (OUTPATIENT)
Dept: OBGYN CLINIC | Facility: CLINIC | Age: 41
End: 2021-01-28

## 2021-01-28 ENCOUNTER — LAB ENCOUNTER (OUTPATIENT)
Dept: LAB | Facility: REFERENCE LAB | Age: 41
End: 2021-01-28
Attending: FAMILY MEDICINE
Payer: MEDICAID

## 2021-01-28 DIAGNOSIS — D72.829 LEUKOCYTOSIS, UNSPECIFIED TYPE: ICD-10-CM

## 2021-01-28 DIAGNOSIS — R74.01 TRANSAMINITIS: ICD-10-CM

## 2021-01-28 LAB
ALBUMIN SERPL-MCNC: 3.5 G/DL (ref 3.4–5)
ALBUMIN/GLOB SERPL: 0.9 {RATIO} (ref 1–2)
ALP LIVER SERPL-CCNC: 103 U/L
ALT SERPL-CCNC: 25 U/L
ANION GAP SERPL CALC-SCNC: 6 MMOL/L (ref 0–18)
AST SERPL-CCNC: 11 U/L (ref 15–37)
BASOPHILS # BLD AUTO: 0.03 X10(3) UL (ref 0–0.2)
BASOPHILS NFR BLD AUTO: 0.3 %
BILIRUB SERPL-MCNC: 0.2 MG/DL (ref 0.1–2)
BUN BLD-MCNC: 10 MG/DL (ref 7–18)
BUN/CREAT SERPL: 11.8 (ref 10–20)
CALCIUM BLD-MCNC: 9.4 MG/DL (ref 8.5–10.1)
CHLORIDE SERPL-SCNC: 106 MMOL/L (ref 98–112)
CO2 SERPL-SCNC: 26 MMOL/L (ref 21–32)
CREAT BLD-MCNC: 0.85 MG/DL
DEPRECATED RDW RBC AUTO: 41.9 FL (ref 35.1–46.3)
EOSINOPHIL # BLD AUTO: 0.19 X10(3) UL (ref 0–0.7)
EOSINOPHIL NFR BLD AUTO: 1.8 %
ERYTHROCYTE [DISTWIDTH] IN BLOOD BY AUTOMATED COUNT: 12.7 % (ref 11–15)
GLOBULIN PLAS-MCNC: 4 G/DL (ref 2.8–4.4)
GLUCOSE BLD-MCNC: 113 MG/DL (ref 70–99)
HCT VFR BLD AUTO: 36.4 %
HGB BLD-MCNC: 11.8 G/DL
IMM GRANULOCYTES # BLD AUTO: 0.03 X10(3) UL (ref 0–1)
IMM GRANULOCYTES NFR BLD: 0.3 %
LYMPHOCYTES # BLD AUTO: 2.62 X10(3) UL (ref 1–4)
LYMPHOCYTES NFR BLD AUTO: 24.9 %
M PROTEIN MFR SERPL ELPH: 7.5 G/DL (ref 6.4–8.2)
MCH RBC QN AUTO: 29 PG (ref 26–34)
MCHC RBC AUTO-ENTMCNC: 32.4 G/DL (ref 31–37)
MCV RBC AUTO: 89.4 FL
MONOCYTES # BLD AUTO: 0.51 X10(3) UL (ref 0.1–1)
MONOCYTES NFR BLD AUTO: 4.9 %
NEUTROPHILS # BLD AUTO: 7.13 X10 (3) UL (ref 1.5–7.7)
NEUTROPHILS # BLD AUTO: 7.13 X10(3) UL (ref 1.5–7.7)
NEUTROPHILS NFR BLD AUTO: 67.8 %
OSMOLALITY SERPL CALC.SUM OF ELEC: 286 MOSM/KG (ref 275–295)
PATIENT FASTING Y/N/NP: YES
PLATELET # BLD AUTO: 440 10(3)UL (ref 150–450)
POTASSIUM SERPL-SCNC: 3.5 MMOL/L (ref 3.5–5.1)
RBC # BLD AUTO: 4.07 X10(6)UL
SODIUM SERPL-SCNC: 138 MMOL/L (ref 136–145)
WBC # BLD AUTO: 10.5 X10(3) UL (ref 4–11)

## 2021-01-28 PROCEDURE — 80053 COMPREHEN METABOLIC PANEL: CPT

## 2021-01-28 PROCEDURE — 85025 COMPLETE CBC W/AUTO DIFF WBC: CPT

## 2021-01-28 PROCEDURE — 36415 COLL VENOUS BLD VENIPUNCTURE: CPT

## 2021-01-28 RX ORDER — BUPROPION HYDROCHLORIDE 150 MG/1
150 TABLET ORAL EVERY MORNING
Qty: 90 TABLET | Refills: 0 | Status: SHIPPED | OUTPATIENT
Start: 2021-01-28 | End: 2021-05-04

## 2021-01-28 RX ORDER — MISOPROSTOL 200 UG/1
TABLET ORAL
Qty: 2 TABLET | Refills: 0 | Status: SHIPPED | OUTPATIENT
Start: 2021-01-28 | End: 2021-01-29 | Stop reason: ALTCHOICE

## 2021-01-28 NOTE — TELEPHONE ENCOUNTER
Jimmy Reina,      I will send a new prescription to your [facility] on file. Any concerns or additional questions, please call us at .     Thanks,     Brian Husbands MS, BSN, RN    Order placed and faxed for misoprostol.         From: Christen [place]

## 2021-01-29 ENCOUNTER — OFFICE VISIT (OUTPATIENT)
Dept: OBGYN CLINIC | Facility: CLINIC | Age: 41
End: 2021-01-29
Payer: MEDICAID

## 2021-01-29 VITALS
WEIGHT: 227 LBS | BODY MASS INDEX: 34.4 KG/M2 | HEIGHT: 68 IN | SYSTOLIC BLOOD PRESSURE: 118 MMHG | DIASTOLIC BLOOD PRESSURE: 80 MMHG

## 2021-01-29 DIAGNOSIS — Z80.3 FAMILY HISTORY OF BREAST CANCER: Primary | ICD-10-CM

## 2021-01-29 DIAGNOSIS — Z30.430 ENCOUNTER FOR INSERTION OF INTRAUTERINE CONTRACEPTIVE DEVICE: ICD-10-CM

## 2021-01-29 PROBLEM — R16.0 HEPATOMEGALY: Status: ACTIVE | Noted: 2021-01-29

## 2021-01-29 PROBLEM — K52.9 COLITIS: Status: ACTIVE | Noted: 2021-01-29

## 2021-01-29 LAB
CONTROL LINE PRESENT WITH A CLEAR BACKGROUND (YES/NO): YES YES/NO
PREGNANCY TEST, URINE: NEGATIVE

## 2021-01-29 PROCEDURE — 3074F SYST BP LT 130 MM HG: CPT | Performed by: OBSTETRICS & GYNECOLOGY

## 2021-01-29 PROCEDURE — 58300 INSERT INTRAUTERINE DEVICE: CPT | Performed by: OBSTETRICS & GYNECOLOGY

## 2021-01-29 PROCEDURE — 81025 URINE PREGNANCY TEST: CPT | Performed by: OBSTETRICS & GYNECOLOGY

## 2021-01-29 PROCEDURE — 3079F DIAST BP 80-89 MM HG: CPT | Performed by: OBSTETRICS & GYNECOLOGY

## 2021-01-29 PROCEDURE — 3008F BODY MASS INDEX DOCD: CPT | Performed by: OBSTETRICS & GYNECOLOGY

## 2021-01-29 NOTE — PROGRESS NOTES
LPS 8/2020  Previously had Mirena IUD without problems. Periods are a little heavier with some migraines. Patient used cytotec prior to the procedure. 1/2021 she was in [place], [place] and had episode of colitis. She has FU with PCP.  Had normal CT sc

## 2021-02-05 RX ORDER — FLUOXETINE 20 MG/1
TABLET, FILM COATED ORAL
Qty: 90 TABLET | Refills: 0 | Status: SHIPPED | OUTPATIENT
Start: 2021-02-05 | End: 2021-06-21

## 2021-02-05 RX ORDER — FLUOXETINE HYDROCHLORIDE 60 MG/1
TABLET, FILM COATED ORAL; ORAL
Qty: 90 TABLET | Refills: 0 | Status: SHIPPED | OUTPATIENT
Start: 2021-02-05 | End: 2022-01-10 | Stop reason: ALTCHOICE

## 2021-02-09 ENCOUNTER — TELEPHONE (OUTPATIENT)
Dept: OBGYN CLINIC | Facility: CLINIC | Age: 41
End: 2021-02-09

## 2021-02-09 NOTE — TELEPHONE ENCOUNTER
Spoke to patient regarding order for MRI placed at last visit. MRI was approved 8/20 until 2/21/21. Asked patient if she can have this scheduled by 2/21/21 and she will schedule appointment for MRI today.   I explained that this authorization is good unti

## 2021-03-08 ENCOUNTER — TELEMEDICINE (OUTPATIENT)
Dept: FAMILY MEDICINE CLINIC | Facility: CLINIC | Age: 41
End: 2021-03-08
Payer: MEDICAID

## 2021-03-08 DIAGNOSIS — F41.1 GENERALIZED ANXIETY DISORDER: ICD-10-CM

## 2021-03-08 DIAGNOSIS — F32.A DEPRESSION, UNSPECIFIED DEPRESSION TYPE: Primary | ICD-10-CM

## 2021-03-08 PROCEDURE — 99213 OFFICE O/P EST LOW 20 MIN: CPT | Performed by: FAMILY MEDICINE

## 2021-03-08 NOTE — PROGRESS NOTES
CC:  Patient presents with:  Medication Follow-Up: feels like the medication is only helping a little but not enough      HPI: 45year old female presenting for video visit to follow-up on medication for depression and anxiety.   Feels she has more energy b Javier lifetime risk was 24%.    • History of use of contraceptive intrauterine device (IUD) 2016    Mirena IUD 2016-08/08/2018   • Human papilloma virus infection 12/2017   • Low grade squamous intraepith lesion on cytologic smear cervix (lgsil) 12/2 Transportation (Medical):       Lack of Transportation (Non-Medical):   Physical Activity:       Days of Exercise per Week:       Minutes of Exercise per Session:   Stress:       Feeling of Stress :   Social Connections:       Frequency of Communication wi FLUoxetine HCl 60 MG Oral Tab TAKE 1 TABLET BY MOUTH  DAILY WITH 20 MG TABLET 90 tablet 0   • buPROPion HCl ER, XL, (WELLBUTRIN XL) 150 MG Oral Tablet 24 Hr Take 1 tablet (150 mg total) by mouth every morning.  90 tablet 0       Dust Mite Extract      Physi the plan of care above.       [facility],   03/08/21  11:51 AM

## 2021-05-04 RX ORDER — BUPROPION HYDROCHLORIDE 150 MG/1
TABLET ORAL
Qty: 90 TABLET | Refills: 0 | Status: SHIPPED | OUTPATIENT
Start: 2021-05-04 | End: 2022-01-10 | Stop reason: ALTCHOICE

## 2021-06-22 RX ORDER — FLUOXETINE 20 MG/1
TABLET, FILM COATED ORAL
Qty: 90 TABLET | Refills: 0 | Status: SHIPPED | OUTPATIENT
Start: 2021-06-22 | End: 2022-01-10 | Stop reason: ALTCHOICE

## 2021-06-22 NOTE — TELEPHONE ENCOUNTER
A refill request was received for:  Requested Prescriptions     Pending Prescriptions Disp Refills   • FLUoxetine HCl 20 MG Oral Tab 90 tablet 0     Sig: TAKE 1 TABLET BY MOUTH  DAILY WITH 60MG TABLET     Last refill date: 2/8/21  Qty:90  Last office visit

## 2021-09-16 ENCOUNTER — PATIENT MESSAGE (OUTPATIENT)
Dept: FAMILY MEDICINE CLINIC | Facility: CLINIC | Age: 41
End: 2021-09-16

## 2021-09-16 DIAGNOSIS — F41.9 ANXIETY: ICD-10-CM

## 2021-09-16 DIAGNOSIS — F32.A DEPRESSION, UNSPECIFIED DEPRESSION TYPE: Primary | ICD-10-CM

## 2021-09-17 NOTE — TELEPHONE ENCOUNTER
From: Rayo London  To: Madi Robledo DO  Sent: 9/16/2021 7:30 PM CDT  Subject: Referral needed    Hello,     I just started [facility] unfortunately she’s leaving.  I needed a new counselor and wanted to start seeing psychiatrist. Frida Hernández

## 2021-12-28 ENCOUNTER — PATIENT MESSAGE (OUTPATIENT)
Dept: FAMILY MEDICINE CLINIC | Facility: CLINIC | Age: 41
End: 2021-12-28

## 2021-12-28 LAB — AMB EXT COVID-19 RESULT: DETECTED

## 2022-01-10 NOTE — TELEPHONE ENCOUNTER
Called pt and scheduled video visit for 01/12/22 at 0800 and pt agrees.       Nely Scales,  1/10/2022  3:09 PM CST    Can offer video visit today at 4:30-added onto my schedule  ----- Message -----  From: Jordan Walls RN  Sent: 1/10/2022  11:47 AM CST

## 2022-01-12 ENCOUNTER — TELEMEDICINE (OUTPATIENT)
Dept: FAMILY MEDICINE CLINIC | Facility: CLINIC | Age: 42
End: 2022-01-12
Payer: MEDICAID

## 2022-01-12 DIAGNOSIS — U07.1 TELEHEALTH ENCOUNTER FOR CONFIRMED COVID-19: Primary | ICD-10-CM

## 2022-01-12 PROCEDURE — 99213 OFFICE O/P EST LOW 20 MIN: CPT | Performed by: FAMILY MEDICINE

## 2022-01-12 RX ORDER — ALBUTEROL SULFATE 90 UG/1
2 AEROSOL, METERED RESPIRATORY (INHALATION) EVERY 4 HOURS PRN
Qty: 18 G | Refills: 0 | Status: SHIPPED | OUTPATIENT
Start: 2022-01-12

## 2022-01-12 NOTE — PROGRESS NOTES
CC:  Patient presents with: Follow - Up: still has sob since having covid. HPI: 37year old female presenting for video visit to discuss shortness of breath since having Covid-19. Went to work on 12/29 and developed a slight sore throat.   Later that Pre-diabetes     PCP told her in 2017       Social History    Socioeconomic History      Marital status: Single      Spouse name: Not on file      Number of children: Not on file      Years of education: Not on file      Highest education level: Not on nahid Covid-19 illness and persistent dyspnea with exertion.     1. Telehealth encounter for confirmed COVID-19    - Symptoms have mostly resolved outside of dyspnea with exertion  - Currently outside of isolation window and okay to continue working  - Will trial

## 2022-01-21 ENCOUNTER — TELEPHONE (OUTPATIENT)
Dept: OBGYN CLINIC | Facility: CLINIC | Age: 42
End: 2022-01-21

## 2022-01-21 ENCOUNTER — HOSPITAL ENCOUNTER (OUTPATIENT)
Dept: MAMMOGRAPHY | Age: 42
Discharge: HOME | End: 2022-01-21
Attending: OBSTETRICS & GYNECOLOGY
Payer: MEDICAID

## 2022-01-21 DIAGNOSIS — Z80.3 FAMILY HISTORY OF BREAST CANCER: ICD-10-CM

## 2022-01-21 PROCEDURE — 77067 SCR MAMMO BI INCL CAD: CPT | Performed by: OBSTETRICS & GYNECOLOGY

## 2022-01-21 PROCEDURE — 77063 BREAST TOMOSYNTHESIS BI: CPT | Performed by: OBSTETRICS & GYNECOLOGY

## 2022-01-21 NOTE — TELEPHONE ENCOUNTER
Ebony @ [facility] calling to request mammogram on pts behalf. Unsure if diagnostic mammogram is needed or additional views? Please update pt on when order is placed so she can schedule accordingly.

## 2022-02-10 ENCOUNTER — HOSPITAL ENCOUNTER (OUTPATIENT)
Dept: MAMMOGRAPHY | Facility: HOSPITAL | Age: 42
Discharge: HOME | End: 2022-02-10
Attending: OBSTETRICS & GYNECOLOGY
Payer: MEDICAID

## 2022-02-10 DIAGNOSIS — R92.2 INCONCLUSIVE MAMMOGRAPHY: ICD-10-CM

## 2022-02-10 PROCEDURE — 77061 BREAST TOMOSYNTHESIS UNI: CPT | Performed by: OBSTETRICS & GYNECOLOGY

## 2022-02-10 PROCEDURE — 76642 ULTRASOUND BREAST LIMITED: CPT | Performed by: OBSTETRICS & GYNECOLOGY

## 2022-02-10 PROCEDURE — 77065 DX MAMMO INCL CAD UNI: CPT | Performed by: OBSTETRICS & GYNECOLOGY

## 2022-02-22 ENCOUNTER — OFFICE VISIT (OUTPATIENT)
Dept: OBGYN CLINIC | Facility: CLINIC | Age: 42
End: 2022-02-22
Payer: MEDICAID

## 2022-02-22 VITALS
SYSTOLIC BLOOD PRESSURE: 134 MMHG | HEIGHT: 68 IN | BODY MASS INDEX: 37.44 KG/M2 | DIASTOLIC BLOOD PRESSURE: 90 MMHG | WEIGHT: 247 LBS

## 2022-02-22 DIAGNOSIS — R87.612 PAPANICOLAOU SMEAR OF CERVIX WITH LOW GRADE SQUAMOUS INTRAEPITHELIAL LESION (LGSIL): Primary | ICD-10-CM

## 2022-02-22 DIAGNOSIS — Z80.3 FAMILY HISTORY OF BREAST CANCER: ICD-10-CM

## 2022-02-22 DIAGNOSIS — R03.0 ELEVATED BLOOD PRESSURE READING: ICD-10-CM

## 2022-02-22 DIAGNOSIS — Z01.419 ENCOUNTER FOR GYNECOLOGICAL EXAMINATION WITHOUT ABNORMAL FINDING: ICD-10-CM

## 2022-02-22 PROCEDURE — 3075F SYST BP GE 130 - 139MM HG: CPT | Performed by: OBSTETRICS & GYNECOLOGY

## 2022-02-22 PROCEDURE — 3080F DIAST BP >= 90 MM HG: CPT | Performed by: OBSTETRICS & GYNECOLOGY

## 2022-02-22 PROCEDURE — 3008F BODY MASS INDEX DOCD: CPT | Performed by: OBSTETRICS & GYNECOLOGY

## 2022-02-22 PROCEDURE — 87624 HPV HI-RISK TYP POOLED RSLT: CPT | Performed by: OBSTETRICS & GYNECOLOGY

## 2022-02-22 PROCEDURE — 99396 PREV VISIT EST AGE 40-64: CPT | Performed by: OBSTETRICS & GYNECOLOGY

## 2022-02-22 PROCEDURE — 88175 CYTOPATH C/V AUTO FLUID REDO: CPT | Performed by: OBSTETRICS & GYNECOLOGY

## 2022-02-22 RX ORDER — LEVONORGESTREL 52 MG/1
1 INTRAUTERINE DEVICE INTRAUTERINE ONCE
COMMUNITY

## 2022-02-22 RX ORDER — ERGOCALCIFEROL (VITAMIN D2) 10 MCG
TABLET ORAL
COMMUNITY

## 2022-02-23 LAB — HPV I/H RISK 1 DNA SPEC QL NAA+PROBE: NEGATIVE

## 2022-02-26 ENCOUNTER — LAB ENCOUNTER (OUTPATIENT)
Dept: LAB | Age: 42
End: 2022-02-26
Attending: FAMILY MEDICINE
Payer: MEDICAID

## 2022-02-26 DIAGNOSIS — Z11.3 VENEREAL DISEASE SCREENING: ICD-10-CM

## 2022-02-26 DIAGNOSIS — M25.50 ARTHRALGIA, UNSPECIFIED JOINT: ICD-10-CM

## 2022-02-26 DIAGNOSIS — Z00.00 ROUTINE GENERAL MEDICAL EXAMINATION AT A HEALTH CARE FACILITY: ICD-10-CM

## 2022-02-26 LAB
ALBUMIN SERPL-MCNC: 3.8 G/DL (ref 3.4–5)
ALBUMIN/GLOB SERPL: 1 {RATIO} (ref 1–2)
ALP LIVER SERPL-CCNC: 100 U/L
ALT SERPL-CCNC: 31 U/L
ANION GAP SERPL CALC-SCNC: 6 MMOL/L (ref 0–18)
BASOPHILS # BLD AUTO: 0.04 X10(3) UL (ref 0–0.2)
BASOPHILS NFR BLD AUTO: 0.5 %
BILIRUB SERPL-MCNC: 0.4 MG/DL (ref 0.1–2)
BUN BLD-MCNC: 5 MG/DL (ref 7–18)
CALCIUM BLD-MCNC: 9.3 MG/DL (ref 8.5–10.1)
CHLORIDE SERPL-SCNC: 104 MMOL/L (ref 98–112)
CHOLEST SERPL-MCNC: 189 MG/DL (ref ?–200)
CO2 SERPL-SCNC: 27 MMOL/L (ref 21–32)
CREAT BLD-MCNC: 0.74 MG/DL
CREAT UR-SCNC: 145 MG/DL
EOSINOPHIL # BLD AUTO: 0.19 X10(3) UL (ref 0–0.7)
EOSINOPHIL NFR BLD AUTO: 2.3 %
ERYTHROCYTE [DISTWIDTH] IN BLOOD BY AUTOMATED COUNT: 12.9 %
EST. AVERAGE GLUCOSE BLD GHB EST-MCNC: 131 MG/DL (ref 68–126)
FASTING PATIENT LIPID ANSWER: YES
FASTING STATUS PATIENT QL REPORTED: YES
GLOBULIN PLAS-MCNC: 3.9 G/DL (ref 2.8–4.4)
GLUCOSE BLD-MCNC: 99 MG/DL (ref 70–99)
HBA1C MFR BLD: 6.2 % (ref ?–5.7)
HCT VFR BLD AUTO: 38.7 %
HCV AB SERPL QL IA: NONREACTIVE
HDLC SERPL-MCNC: 57 MG/DL (ref 40–59)
HGB BLD-MCNC: 13.1 G/DL
IMM GRANULOCYTES # BLD AUTO: 0.03 X10(3) UL (ref 0–1)
LDLC SERPL CALC-MCNC: 119 MG/DL (ref ?–100)
LYMPHOCYTES # BLD AUTO: 1.92 X10(3) UL (ref 1–4)
LYMPHOCYTES NFR BLD AUTO: 23.3 %
MCH RBC QN AUTO: 28.6 PG (ref 26–34)
MCHC RBC AUTO-ENTMCNC: 33.9 G/DL (ref 31–37)
MCV RBC AUTO: 84.5 FL
MICROALBUMIN UR-MCNC: 1.43 MG/DL
MICROALBUMIN/CREAT 24H UR-RTO: 9.9 UG/MG (ref ?–30)
MONOCYTES # BLD AUTO: 0.42 X10(3) UL (ref 0.1–1)
MONOCYTES NFR BLD AUTO: 5.1 %
NEUTROPHILS # BLD AUTO: 5.65 X10 (3) UL (ref 1.5–7.7)
NEUTROPHILS # BLD AUTO: 5.65 X10(3) UL (ref 1.5–7.7)
NEUTROPHILS NFR BLD AUTO: 68.4 %
NONHDLC SERPL-MCNC: 132 MG/DL (ref ?–130)
OSMOLALITY SERPL CALC.SUM OF ELEC: 281 MOSM/KG (ref 275–295)
PLATELET # BLD AUTO: 415 10(3)UL (ref 150–450)
POTASSIUM SERPL-SCNC: 4.2 MMOL/L (ref 3.5–5.1)
PROT SERPL-MCNC: 7.7 G/DL (ref 6.4–8.2)
RBC # BLD AUTO: 4.58 X10(6)UL
RHEUMATOID FACT SERPL-ACNC: <10 IU/ML (ref ?–15)
SODIUM SERPL-SCNC: 137 MMOL/L (ref 136–145)
T PALLIDUM AB SER QL IA: NONREACTIVE
TRIGL SERPL-MCNC: 68 MG/DL (ref 30–149)
VLDLC SERPL CALC-MCNC: 12 MG/DL (ref 0–30)
WBC # BLD AUTO: 8.3 X10(3) UL (ref 4–11)

## 2022-02-26 PROCEDURE — 86200 CCP ANTIBODY: CPT

## 2022-02-26 PROCEDURE — 86803 HEPATITIS C AB TEST: CPT

## 2022-02-26 PROCEDURE — 87389 HIV-1 AG W/HIV-1&-2 AB AG IA: CPT

## 2022-02-26 PROCEDURE — 87591 N.GONORRHOEAE DNA AMP PROB: CPT

## 2022-02-26 PROCEDURE — 80053 COMPREHEN METABOLIC PANEL: CPT

## 2022-02-26 PROCEDURE — 83036 HEMOGLOBIN GLYCOSYLATED A1C: CPT

## 2022-02-26 PROCEDURE — 82570 ASSAY OF URINE CREATININE: CPT

## 2022-02-26 PROCEDURE — 36415 COLL VENOUS BLD VENIPUNCTURE: CPT

## 2022-02-26 PROCEDURE — 86431 RHEUMATOID FACTOR QUANT: CPT

## 2022-02-26 PROCEDURE — 87491 CHLMYD TRACH DNA AMP PROBE: CPT

## 2022-02-26 PROCEDURE — 86780 TREPONEMA PALLIDUM: CPT

## 2022-02-26 PROCEDURE — 85025 COMPLETE CBC W/AUTO DIFF WBC: CPT

## 2022-02-26 PROCEDURE — 80061 LIPID PANEL: CPT

## 2022-02-26 PROCEDURE — 82043 UR ALBUMIN QUANTITATIVE: CPT

## 2022-02-28 LAB
C TRACH DNA SPEC QL NAA+PROBE: NEGATIVE
N GONORRHOEA DNA SPEC QL NAA+PROBE: NEGATIVE

## 2022-03-01 PROBLEM — R73.03 PREDIABETES: Status: ACTIVE | Noted: 2022-03-01

## 2022-03-01 LAB — CCP IGG SERPL-ACNC: 2.8 U/ML (ref 0–6.9)

## 2022-03-03 ENCOUNTER — OFFICE VISIT (OUTPATIENT)
Dept: FAMILY MEDICINE CLINIC | Facility: CLINIC | Age: 42
End: 2022-03-03
Payer: MEDICAID

## 2022-03-03 VITALS
HEART RATE: 93 BPM | BODY MASS INDEX: 37.28 KG/M2 | WEIGHT: 246 LBS | OXYGEN SATURATION: 99 % | SYSTOLIC BLOOD PRESSURE: 132 MMHG | HEIGHT: 68 IN | DIASTOLIC BLOOD PRESSURE: 88 MMHG

## 2022-03-03 DIAGNOSIS — R06.02 POST-COVID CHRONIC SHORTNESS OF BREATH: ICD-10-CM

## 2022-03-03 DIAGNOSIS — Z00.00 ENCOUNTER FOR ROUTINE ADULT HEALTH EXAMINATION WITHOUT ABNORMAL FINDINGS: Primary | ICD-10-CM

## 2022-03-03 DIAGNOSIS — R73.03 PREDIABETES: ICD-10-CM

## 2022-03-03 DIAGNOSIS — R09.89 GLOBUS SENSATION: ICD-10-CM

## 2022-03-03 DIAGNOSIS — Z80.3 FAMILY HISTORY OF BREAST CANCER: ICD-10-CM

## 2022-03-03 DIAGNOSIS — U09.9 POST-COVID CHRONIC SHORTNESS OF BREATH: ICD-10-CM

## 2022-03-03 PROBLEM — F41.9 ANXIETY: Status: RESOLVED | Noted: 2018-07-25 | Resolved: 2022-03-03

## 2022-03-03 PROBLEM — K52.9 COLITIS: Status: RESOLVED | Noted: 2021-01-29 | Resolved: 2022-03-03

## 2022-03-03 PROCEDURE — 3008F BODY MASS INDEX DOCD: CPT | Performed by: FAMILY MEDICINE

## 2022-03-03 PROCEDURE — 3079F DIAST BP 80-89 MM HG: CPT | Performed by: FAMILY MEDICINE

## 2022-03-03 PROCEDURE — 99213 OFFICE O/P EST LOW 20 MIN: CPT | Performed by: FAMILY MEDICINE

## 2022-03-03 PROCEDURE — 99396 PREV VISIT EST AGE 40-64: CPT | Performed by: FAMILY MEDICINE

## 2022-03-03 PROCEDURE — 3075F SYST BP GE 130 - 139MM HG: CPT | Performed by: FAMILY MEDICINE

## 2022-03-03 RX ORDER — PREDNISONE 20 MG/1
40 TABLET ORAL DAILY
Qty: 10 TABLET | Refills: 0 | Status: SHIPPED | OUTPATIENT
Start: 2022-03-03 | End: 2022-03-08

## 2022-03-05 LAB — LAST PAP RESULT: NORMAL

## 2022-04-01 ENCOUNTER — NURSE TRIAGE (OUTPATIENT)
Dept: FAMILY MEDICINE CLINIC | Facility: CLINIC | Age: 42
End: 2022-04-01

## 2022-04-04 ENCOUNTER — TELEMEDICINE (OUTPATIENT)
Dept: FAMILY MEDICINE CLINIC | Facility: CLINIC | Age: 42
End: 2022-04-04
Payer: COMMERCIAL

## 2022-04-04 DIAGNOSIS — J06.9 VIRAL URI: Primary | ICD-10-CM

## 2022-04-04 PROCEDURE — 99213 OFFICE O/P EST LOW 20 MIN: CPT | Performed by: FAMILY MEDICINE

## 2022-04-05 ENCOUNTER — TELEPHONE (OUTPATIENT)
Dept: FAMILY MEDICINE CLINIC | Facility: CLINIC | Age: 42
End: 2022-04-05

## 2022-04-05 NOTE — TELEPHONE ENCOUNTER
Dr. Devon Reyes     Please see T-ZONE message       Does patient need to be seen ? You have no appts for the remainder  of the week     Please advise and thank you.

## 2022-04-05 NOTE — TELEPHONE ENCOUNTER
Dr. Nicole Zamudio   Letter pended for your review, approval and signature    Thank you             Marian Martinez, DO  Em Triage Support 2 hours ago (2:35 PM)     Okay to provide work excuse note for days missed. Thank you.     Message text

## 2022-04-26 NOTE — TELEPHONE ENCOUNTER
From: Poppy [place]  To:  Bessy Odonnell DO  Sent: 4/26/2022 2:14 PM CDT  Subject: Medardo Keyes,       I wanted to know if I can have a referral entered for [facility]

## 2022-05-03 ENCOUNTER — HOSPITAL ENCOUNTER (OUTPATIENT)
Dept: CV DIAGNOSTICS | Facility: HOSPITAL | Age: 42
Discharge: HOME | End: 2022-05-03
Attending: FAMILY MEDICINE
Payer: COMMERCIAL

## 2022-05-03 DIAGNOSIS — R06.02 SHORTNESS OF BREATH: ICD-10-CM

## 2022-05-03 PROCEDURE — 93306 TTE W/DOPPLER COMPLETE: CPT | Performed by: FAMILY MEDICINE

## 2022-06-28 PROBLEM — F33.1 MODERATE EPISODE OF RECURRENT MAJOR DEPRESSIVE DISORDER (HCC): Status: ACTIVE | Noted: 2018-07-25

## 2022-07-15 ENCOUNTER — MED REC SCAN ONLY (OUTPATIENT)
Dept: FAMILY MEDICINE CLINIC | Facility: CLINIC | Age: 42
End: 2022-07-15

## 2022-07-25 RX ORDER — FLUOXETINE HYDROCHLORIDE 20 MG/1
20 CAPSULE ORAL DAILY
Qty: 90 CAPSULE | Refills: 0 | Status: SHIPPED | OUTPATIENT
Start: 2022-07-25

## 2022-09-23 RX ORDER — ALBUTEROL SULFATE 90 UG/1
2 AEROSOL, METERED RESPIRATORY (INHALATION) EVERY 4 HOURS PRN
Qty: 18 G | Refills: 1 | Status: SHIPPED | OUTPATIENT
Start: 2022-09-23

## 2022-09-23 NOTE — TELEPHONE ENCOUNTER
Refill passed per [facility] [place] protocol. Requested Prescriptions   Pending Prescriptions Disp Refills    albuterol 108 (90 Base) MCG/ACT Inhalation Aero Soln 18 g 0     Sig: Inhale 2 puffs into the lungs every 4 (four) hours as needed for Wheezing or Shortness of Breath.         Asthma & COPD Medication Protocol Passed - 9/22/2022  7:53 PM        Passed - In person appointment or virtual visit in the past 6 mos or appointment in next 3 mos       Recent Outpatient Visits              4 weeks ago Moderate episode of recurrent major depressive disorder [facility]    [address], [place]    Telemedicine    1 month ago Generalized anxiety disorder    [address], [place]    Telemedicine    2 months ago Moderate episode of recurrent major depressive disorder [facility])    [address] [facility], [address], [place]    Telemedicine    5 months ago [address] [facility] [place]    Telemedicine    6 months ago Encounter for routine adult health examination without abnormal findings    [address], [place]    Office Visit     Future Appointments         Provider Department Appt Notes    In 1 week Verla Holter [facility] Cesar f/u medication - virutal                   Recent Outpatient Visits              4 weeks ago Moderate episode of recurrent major depressive disorder ([facility]    [address], [place]    Telemedicine    1 month ago Generalized anxiety disorder    [address], [place]    Telemedicine    2 months ago Moderate episode of recurrent major depressive disorder [facility])    [address], [place], [address] [place] [address] months ago Viral URI    [address] [address] Pierre Larkin DO    Telemedicine    6 months ago Encounter for routine adult health examination without abnormal findings    [address], [place]    Office Visit          Future Appointments         Provider Department Appt Notes    In 1 week Jem Tucker f/u medication - virutal

## 2022-10-28 ENCOUNTER — TELEPHONE (OUTPATIENT)
Dept: FAMILY MEDICINE CLINIC | Facility: CLINIC | Age: 42
End: 2022-10-28

## 2022-10-28 NOTE — TELEPHONE ENCOUNTER
Patient is on long term disability through [employer]. Miranda Garcia is requesting the following records from Jan- March of 2022.      Office visit notes  Any care received for Christa Collado Fax  Att: Stanislaw Skinner  [phone]

## 2023-01-04 ENCOUNTER — OFFICE VISIT (OUTPATIENT)
Dept: FAMILY MEDICINE CLINIC | Facility: CLINIC | Age: 43
End: 2023-01-04
Payer: COMMERCIAL

## 2023-01-04 VITALS
DIASTOLIC BLOOD PRESSURE: 86 MMHG | SYSTOLIC BLOOD PRESSURE: 126 MMHG | BODY MASS INDEX: 33.34 KG/M2 | TEMPERATURE: 99 F | RESPIRATION RATE: 18 BRPM | HEIGHT: 68 IN | OXYGEN SATURATION: 99 % | HEART RATE: 89 BPM | WEIGHT: 220 LBS

## 2023-01-04 DIAGNOSIS — Z20.822 EXPOSURE TO COVID-19 VIRUS: Primary | ICD-10-CM

## 2023-01-04 PROCEDURE — 3079F DIAST BP 80-89 MM HG: CPT | Performed by: NURSE PRACTITIONER

## 2023-01-04 PROCEDURE — 3074F SYST BP LT 130 MM HG: CPT | Performed by: NURSE PRACTITIONER

## 2023-01-04 PROCEDURE — 3008F BODY MASS INDEX DOCD: CPT | Performed by: NURSE PRACTITIONER

## 2023-01-04 PROCEDURE — 99213 OFFICE O/P EST LOW 20 MIN: CPT | Performed by: NURSE PRACTITIONER

## 2023-01-06 LAB — SARS-COV-2 RNA RESP QL NAA+PROBE: DETECTED

## 2023-01-19 RX ORDER — ALBUTEROL SULFATE 90 UG/1
2 AEROSOL, METERED RESPIRATORY (INHALATION) EVERY 4 HOURS PRN
Qty: 18 G | Refills: 1 | Status: SHIPPED | OUTPATIENT
Start: 2023-01-19

## 2023-01-19 NOTE — TELEPHONE ENCOUNTER
Refill passed per [facility] [facility] [place] protocol. Requested Prescriptions   Pending Prescriptions Disp Refills    albuterol 108 (90 Base) MCG/ACT Inhalation Aero Soln 18 g 1     Sig: Inhale 2 puffs into the lungs every 4 (four) hours as needed for Wheezing or Shortness of Breath.        Asthma & COPD Medication Protocol Passed - 1/19/2023 12:48 AM        Passed - In person appointment or virtual visit in the past 6 mos or appointment in next 3 mos     Recent Outpatient Visits              2 weeks ago Exposure to COVID-19 virus    [address], [facility], [facility], [address], APRN    Office Visit    5 months ago Generalized anxiety disorder    [address] [facility], [facility] [address] [place], [place]    Telemedicine    6 months ago Moderate episode of recurrent major depressive disorder [facility])    [address], [facility], [facility] [address] [place], [place]    Telemedicine    9 months ago [address] [facility], [facility] [address] Janie Boland DO    Telemedicine    10 months ago Encounter for routine adult health examination without abnormal findings    [address] [facility], [address] [place], [place]    Office Visit                            Recent Outpatient Visits              2 weeks ago Exposure to COVID-19 virus    [address], [facility], [facility], [address], APRN    Office Visit    5 months ago Generalized anxiety disorder    [address] [facility], [facility] [address] [place] [place], [place]    Telemedicine    6 months ago Moderate episode of recurrent major depressive disorder [facility])    [address], [address], [address] [place], [place]    Telemedicine    9 months ago Viral URI    [address] [facility], [facility] [address] Janie Boland DO    Telemedicine    10 months ago Encounter for routine adult health examination without abnormal findings    [address] [facility] [place], [place]    Office Visit

## 2023-03-16 ENCOUNTER — PATIENT MESSAGE (OUTPATIENT)
Dept: OBGYN CLINIC | Facility: CLINIC | Age: 43
End: 2023-03-16

## 2023-03-16 DIAGNOSIS — Z12.31 ENCOUNTER FOR SCREENING MAMMOGRAM FOR BREAST CANCER: Primary | ICD-10-CM

## 2023-03-18 ENCOUNTER — HOSPITAL ENCOUNTER (OUTPATIENT)
Dept: MAMMOGRAPHY | Age: 43
Discharge: HOME | End: 2023-03-18
Attending: OBSTETRICS & GYNECOLOGY
Payer: COMMERCIAL

## 2023-03-18 DIAGNOSIS — Z12.31 ENCOUNTER FOR SCREENING MAMMOGRAM FOR BREAST CANCER: ICD-10-CM

## 2023-03-18 PROCEDURE — 77063 BREAST TOMOSYNTHESIS BI: CPT | Performed by: OBSTETRICS & GYNECOLOGY

## 2023-03-18 PROCEDURE — 77067 SCR MAMMO BI INCL CAD: CPT | Performed by: OBSTETRICS & GYNECOLOGY

## 2023-04-27 ENCOUNTER — OFFICE VISIT (OUTPATIENT)
Dept: OBGYN CLINIC | Facility: CLINIC | Age: 43
End: 2023-04-27
Payer: COMMERCIAL

## 2023-04-27 VITALS
WEIGHT: 226 LBS | SYSTOLIC BLOOD PRESSURE: 140 MMHG | HEIGHT: 68 IN | DIASTOLIC BLOOD PRESSURE: 90 MMHG | BODY MASS INDEX: 34.25 KG/M2

## 2023-04-27 DIAGNOSIS — R87.612 PAPANICOLAOU SMEAR OF CERVIX WITH LOW GRADE SQUAMOUS INTRAEPITHELIAL LESION (LGSIL): ICD-10-CM

## 2023-04-27 DIAGNOSIS — Z80.3 FAMILY HISTORY OF BREAST CANCER: ICD-10-CM

## 2023-04-27 DIAGNOSIS — Z12.31 ENCOUNTER FOR SCREENING MAMMOGRAM FOR BREAST CANCER: Primary | ICD-10-CM

## 2023-04-27 DIAGNOSIS — Z01.419 ENCOUNTER FOR GYNECOLOGICAL EXAMINATION WITHOUT ABNORMAL FINDING: ICD-10-CM

## 2023-04-27 PROCEDURE — 87106 FUNGI IDENTIFICATION YEAST: CPT | Performed by: OBSTETRICS & GYNECOLOGY

## 2023-04-27 PROCEDURE — 3008F BODY MASS INDEX DOCD: CPT | Performed by: OBSTETRICS & GYNECOLOGY

## 2023-04-27 PROCEDURE — 99396 PREV VISIT EST AGE 40-64: CPT | Performed by: OBSTETRICS & GYNECOLOGY

## 2023-04-27 PROCEDURE — 3080F DIAST BP >= 90 MM HG: CPT | Performed by: OBSTETRICS & GYNECOLOGY

## 2023-04-27 PROCEDURE — 87808 TRICHOMONAS ASSAY W/OPTIC: CPT | Performed by: OBSTETRICS & GYNECOLOGY

## 2023-04-27 PROCEDURE — 87491 CHLMYD TRACH DNA AMP PROBE: CPT | Performed by: OBSTETRICS & GYNECOLOGY

## 2023-04-27 PROCEDURE — 87205 SMEAR GRAM STAIN: CPT | Performed by: OBSTETRICS & GYNECOLOGY

## 2023-04-27 PROCEDURE — 87624 HPV HI-RISK TYP POOLED RSLT: CPT | Performed by: OBSTETRICS & GYNECOLOGY

## 2023-04-27 PROCEDURE — 87591 N.GONORRHOEAE DNA AMP PROB: CPT | Performed by: OBSTETRICS & GYNECOLOGY

## 2023-04-27 PROCEDURE — 3077F SYST BP >= 140 MM HG: CPT | Performed by: OBSTETRICS & GYNECOLOGY

## 2023-04-28 LAB
C TRACH DNA SPEC QL NAA+PROBE: NEGATIVE
HPV I/H RISK 1 DNA SPEC QL NAA+PROBE: NEGATIVE
N GONORRHOEA DNA SPEC QL NAA+PROBE: NEGATIVE

## 2023-04-29 LAB
GENITAL VAGINOSIS SCREEN: NEGATIVE
TRICHOMONAS SCREEN: NEGATIVE

## 2023-05-19 ENCOUNTER — PATIENT MESSAGE (OUTPATIENT)
Facility: CLINIC | Age: 43
End: 2023-05-19

## 2023-05-19 DIAGNOSIS — Z11.1 SCREENING FOR TUBERCULOSIS: ICD-10-CM

## 2023-05-19 DIAGNOSIS — E55.9 VITAMIN D DEFICIENCY: ICD-10-CM

## 2023-05-19 DIAGNOSIS — Z00.00 ROUTINE GENERAL MEDICAL EXAMINATION AT A HEALTH CARE FACILITY: ICD-10-CM

## 2023-05-19 DIAGNOSIS — R53.83 FATIGUE, UNSPECIFIED TYPE: Primary | ICD-10-CM

## 2023-05-20 NOTE — TELEPHONE ENCOUNTER
From: Loc Mcrae  Sent: 5/19/2023  5:26 PM CDT  To: Em Triage Support  Subject: Labs Needed     I meant TB test       Jimmy Johnson,  I have an upcoming appointment on 5/25 and was requesting to have labs submitted prior to visit. I was requesting my annually blood work panel, with thyroid levels being checked, and any hormone related labs.  I also wanted to know I can have a tdap test submitted for my new job

## 2023-05-22 NOTE — TELEPHONE ENCOUNTER
Orders for blood work placed and we can discuss her concerns regarding hormonal levels at her visit.

## 2023-05-23 ENCOUNTER — LAB ENCOUNTER (OUTPATIENT)
Dept: LAB | Age: 43
End: 2023-05-23
Attending: FAMILY MEDICINE
Payer: COMMERCIAL

## 2023-05-23 DIAGNOSIS — Z00.00 ROUTINE GENERAL MEDICAL EXAMINATION AT A HEALTH CARE FACILITY: ICD-10-CM

## 2023-05-23 DIAGNOSIS — E55.9 VITAMIN D DEFICIENCY: ICD-10-CM

## 2023-05-23 DIAGNOSIS — R53.83 FATIGUE, UNSPECIFIED TYPE: ICD-10-CM

## 2023-05-23 DIAGNOSIS — Z01.419 ENCOUNTER FOR GYNECOLOGICAL EXAMINATION WITHOUT ABNORMAL FINDING: ICD-10-CM

## 2023-05-23 DIAGNOSIS — Z11.1 SCREENING FOR TUBERCULOSIS: ICD-10-CM

## 2023-05-23 LAB
ALBUMIN SERPL-MCNC: 4.1 G/DL (ref 3.4–5)
ALBUMIN/GLOB SERPL: 1.1 {RATIO} (ref 1–2)
ALP LIVER SERPL-CCNC: 82 U/L
ALT SERPL-CCNC: 29 U/L
ANION GAP SERPL CALC-SCNC: 5 MMOL/L (ref 0–18)
AST SERPL-CCNC: 9 U/L (ref 15–37)
BASOPHILS # BLD AUTO: 0.03 X10(3) UL (ref 0–0.2)
BASOPHILS NFR BLD AUTO: 0.4 %
BILIRUB SERPL-MCNC: 0.4 MG/DL (ref 0.1–2)
BUN BLD-MCNC: 10 MG/DL (ref 7–18)
CALCIUM BLD-MCNC: 9 MG/DL (ref 8.5–10.1)
CHLORIDE SERPL-SCNC: 105 MMOL/L (ref 98–112)
CHOLEST SERPL-MCNC: 182 MG/DL (ref ?–200)
CO2 SERPL-SCNC: 26 MMOL/L (ref 21–32)
CREAT BLD-MCNC: 0.88 MG/DL
EOSINOPHIL # BLD AUTO: 0.17 X10(3) UL (ref 0–0.7)
EOSINOPHIL NFR BLD AUTO: 2.5 %
ERYTHROCYTE [DISTWIDTH] IN BLOOD BY AUTOMATED COUNT: 12.5 %
EST. AVERAGE GLUCOSE BLD GHB EST-MCNC: 120 MG/DL (ref 68–126)
FASTING PATIENT LIPID ANSWER: YES
FASTING STATUS PATIENT QL REPORTED: YES
GFR SERPLBLD BASED ON 1.73 SQ M-ARVRAT: 85 ML/MIN/1.73M2 (ref 60–?)
GLOBULIN PLAS-MCNC: 3.6 G/DL (ref 2.8–4.4)
GLUCOSE BLD-MCNC: 95 MG/DL (ref 70–99)
HBA1C MFR BLD: 5.8 % (ref ?–5.7)
HBV SURFACE AG SER-ACNC: <0.1 [IU]/L
HBV SURFACE AG SERPL QL IA: NONREACTIVE
HCT VFR BLD AUTO: 39.4 %
HCV AB SERPL QL IA: NONREACTIVE
HDLC SERPL-MCNC: 65 MG/DL (ref 40–59)
HGB BLD-MCNC: 13 G/DL
IMM GRANULOCYTES # BLD AUTO: 0.02 X10(3) UL (ref 0–1)
IMM GRANULOCYTES NFR BLD: 0.3 %
LDLC SERPL CALC-MCNC: 103 MG/DL (ref ?–100)
LYMPHOCYTES # BLD AUTO: 1.88 X10(3) UL (ref 1–4)
LYMPHOCYTES NFR BLD AUTO: 28.1 %
MCH RBC QN AUTO: 29.1 PG (ref 26–34)
MCHC RBC AUTO-ENTMCNC: 33 G/DL (ref 31–37)
MCV RBC AUTO: 88.1 FL
MONOCYTES # BLD AUTO: 0.39 X10(3) UL (ref 0.1–1)
MONOCYTES NFR BLD AUTO: 5.8 %
NEUTROPHILS # BLD AUTO: 4.2 X10 (3) UL (ref 1.5–7.7)
NEUTROPHILS # BLD AUTO: 4.2 X10(3) UL (ref 1.5–7.7)
NEUTROPHILS NFR BLD AUTO: 62.9 %
NONHDLC SERPL-MCNC: 117 MG/DL (ref ?–130)
OSMOLALITY SERPL CALC.SUM OF ELEC: 281 MOSM/KG (ref 275–295)
PLATELET # BLD AUTO: 429 10(3)UL (ref 150–450)
POTASSIUM SERPL-SCNC: 3.9 MMOL/L (ref 3.5–5.1)
PROT SERPL-MCNC: 7.7 G/DL (ref 6.4–8.2)
RBC # BLD AUTO: 4.47 X10(6)UL
SODIUM SERPL-SCNC: 136 MMOL/L (ref 136–145)
T PALLIDUM AB SER QL IA: NONREACTIVE
TRIGL SERPL-MCNC: 74 MG/DL (ref 30–149)
TSI SER-ACNC: 0.68 MIU/ML (ref 0.36–3.74)
VIT D+METAB SERPL-MCNC: 33.9 NG/ML (ref 30–100)
VLDLC SERPL CALC-MCNC: 12 MG/DL (ref 0–30)
WBC # BLD AUTO: 6.7 X10(3) UL (ref 4–11)

## 2023-05-23 PROCEDURE — 86780 TREPONEMA PALLIDUM: CPT

## 2023-05-23 PROCEDURE — 80053 COMPREHEN METABOLIC PANEL: CPT

## 2023-05-23 PROCEDURE — 82306 VITAMIN D 25 HYDROXY: CPT

## 2023-05-23 PROCEDURE — 86480 TB TEST CELL IMMUN MEASURE: CPT

## 2023-05-23 PROCEDURE — 83036 HEMOGLOBIN GLYCOSYLATED A1C: CPT

## 2023-05-23 PROCEDURE — 84443 ASSAY THYROID STIM HORMONE: CPT

## 2023-05-23 PROCEDURE — 87340 HEPATITIS B SURFACE AG IA: CPT

## 2023-05-23 PROCEDURE — 86803 HEPATITIS C AB TEST: CPT

## 2023-05-23 PROCEDURE — 85025 COMPLETE CBC W/AUTO DIFF WBC: CPT

## 2023-05-23 PROCEDURE — 80061 LIPID PANEL: CPT

## 2023-05-23 PROCEDURE — 87389 HIV-1 AG W/HIV-1&-2 AB AG IA: CPT

## 2023-05-25 ENCOUNTER — OFFICE VISIT (OUTPATIENT)
Facility: CLINIC | Age: 43
End: 2023-05-25
Payer: COMMERCIAL

## 2023-05-25 VITALS
DIASTOLIC BLOOD PRESSURE: 76 MMHG | WEIGHT: 226 LBS | SYSTOLIC BLOOD PRESSURE: 124 MMHG | OXYGEN SATURATION: 99 % | BODY MASS INDEX: 34.25 KG/M2 | HEIGHT: 68 IN | HEART RATE: 79 BPM

## 2023-05-25 DIAGNOSIS — Z00.00 ENCOUNTER FOR ROUTINE ADULT HEALTH EXAMINATION WITHOUT ABNORMAL FINDINGS: Primary | ICD-10-CM

## 2023-05-25 DIAGNOSIS — L30.4 INTERTRIGO: ICD-10-CM

## 2023-05-25 LAB
M TB IFN-G CD4+ T-CELLS BLD-ACNC: 0.03 IU/ML
M TB TUBERC IFN-G BLD QL: NEGATIVE
M TB TUBERC IGNF/MITOGEN IGNF CONTROL: >10 IU/ML
QFT TB1 AG MINUS NIL: 0.02 IU/ML
QFT TB2 AG MINUS NIL: 0.03 IU/ML

## 2023-05-25 PROCEDURE — 3008F BODY MASS INDEX DOCD: CPT | Performed by: FAMILY MEDICINE

## 2023-05-25 PROCEDURE — 3078F DIAST BP <80 MM HG: CPT | Performed by: FAMILY MEDICINE

## 2023-05-25 PROCEDURE — 99396 PREV VISIT EST AGE 40-64: CPT | Performed by: FAMILY MEDICINE

## 2023-05-25 PROCEDURE — 3074F SYST BP LT 130 MM HG: CPT | Performed by: FAMILY MEDICINE

## 2023-05-25 RX ORDER — BUPROPION HYDROCHLORIDE 150 MG/1
150 TABLET ORAL EVERY MORNING
COMMUNITY
Start: 2023-04-30 | End: 2023-05-26

## 2023-05-25 RX ORDER — CLOTRIMAZOLE AND BETAMETHASONE DIPROPIONATE 10; .64 MG/G; MG/G
1 CREAM TOPICAL 2 TIMES DAILY
Qty: 60 G | Refills: 0 | Status: SHIPPED | OUTPATIENT
Start: 2023-05-25

## 2023-05-26 NOTE — TELEPHONE ENCOUNTER
Please review; protocol failed/No Protcol    Requested Prescriptions   Pending Prescriptions Disp Refills    buPROPion  MG Oral Tablet 24 Hr  0     Sig: Take 1 tablet (150 mg total) by mouth every morning.        Psychiatric Non-Scheduled (Anti-Anxiety) Passed - 5/26/2023  7:29 AM        Passed - In person appointment or virtual visit in the past 6 mos or appointment in next 3 mos     Recent Outpatient Visits              Yesterday Encounter for routine adult health examination without abnormal findings    Jb Doe, [address], [place]    Office Visit    4 weeks ago Encounter for screening mammogram for breast cancer    J[facility] Doe [address] Estefany Mendosa MD    Office Visit    4 months ago Exposure to COVID-19 virus    [address], [facility] Shiela Daniels, SHIRA    Office Visit    9 months ago Generalized anxiety disorder    Lisbet Burkett, [place]    Telemedicine    11 months ago Moderate episode of recurrent major depressive disorder [facility])    [address], [address], Shukri Merida Billing, [address] Encounter for routine adult health examination without abnormal findings    Jb Doe, [address] [place], [place]    Office Visit    4 weeks ago Encounter for screening mammogram for breast cancer    Pamela Burkett MD    Office Visit    4 months ago Exposure to COVID-19 virus    [address], [facility] Sick[facility], [address], [employer] Visit    9 months ago Generalized anxiety disorder    Jb Doe, [address] [place], [place]    Telemedicine    11 months ago Moderate episode of recurrent major depressive disorder [facility])    [address], [address], [address] [place], [place]    Telemedicine

## 2023-05-27 RX ORDER — BUPROPION HYDROCHLORIDE 150 MG/1
150 TABLET ORAL EVERY MORNING
Qty: 90 TABLET | Refills: 0 | Status: SHIPPED | OUTPATIENT
Start: 2023-05-27

## 2023-06-01 ENCOUNTER — TELEPHONE (OUTPATIENT)
Dept: OBGYN CLINIC | Facility: CLINIC | Age: 43
End: 2023-06-01

## 2023-06-02 ENCOUNTER — TELEPHONE (OUTPATIENT)
Facility: CLINIC | Age: 43
End: 2023-06-02

## 2023-06-02 NOTE — TELEPHONE ENCOUNTER
Prior authorization for clotrimazole-betamethasone cream has been approved. The pharmacy has been notified.

## 2023-06-02 NOTE — TELEPHONE ENCOUNTER
Received a prior authorization form from 's office to be completed for clotrimazole-betamethasone. I called [facility] to verify and it does require a prior authorization     Prior authorization form has been filled out for clotrimazole-betamethasone cream and faxed to [place] to [phone] along with 5/25/23 office note.  It can take 1-5 business days for a decision to come back

## 2023-08-08 ENCOUNTER — PATIENT MESSAGE (OUTPATIENT)
Facility: CLINIC | Age: 43
End: 2023-08-08

## 2023-08-22 ENCOUNTER — LAB ENCOUNTER (OUTPATIENT)
Dept: LAB | Age: 43
End: 2023-08-22
Payer: MEDICAID

## 2023-08-22 ENCOUNTER — OFFICE VISIT (OUTPATIENT)
Dept: FAMILY MEDICINE CLINIC | Facility: CLINIC | Age: 43
End: 2023-08-22
Payer: COMMERCIAL

## 2023-08-22 VITALS
DIASTOLIC BLOOD PRESSURE: 86 MMHG | HEART RATE: 88 BPM | WEIGHT: 225 LBS | TEMPERATURE: 98 F | BODY MASS INDEX: 34 KG/M2 | OXYGEN SATURATION: 97 % | RESPIRATION RATE: 16 BRPM | SYSTOLIC BLOOD PRESSURE: 118 MMHG

## 2023-08-22 DIAGNOSIS — R59.9 SWOLLEN LYMPH NODES: Primary | ICD-10-CM

## 2023-08-22 DIAGNOSIS — R59.9 SWOLLEN LYMPH NODES: ICD-10-CM

## 2023-08-22 DIAGNOSIS — R53.83 FATIGUE, UNSPECIFIED TYPE: ICD-10-CM

## 2023-08-22 LAB
HETEROPH AB SER QL: NEGATIVE
OPERATOR ID: NORMAL
POCT LOT NUMBER: NORMAL
RAPID SARS-COV-2 BY PCR: NOT DETECTED

## 2023-08-22 PROCEDURE — 3074F SYST BP LT 130 MM HG: CPT

## 2023-08-22 PROCEDURE — 86665 EPSTEIN-BARR CAPSID VCA: CPT

## 2023-08-22 PROCEDURE — 3079F DIAST BP 80-89 MM HG: CPT

## 2023-08-22 PROCEDURE — 86664 EPSTEIN-BARR NUCLEAR ANTIGEN: CPT

## 2023-08-22 PROCEDURE — 86403 PARTICLE AGGLUT ANTBDY SCRN: CPT

## 2023-08-22 PROCEDURE — 99213 OFFICE O/P EST LOW 20 MIN: CPT

## 2023-08-22 PROCEDURE — U0002 COVID-19 LAB TEST NON-CDC: HCPCS

## 2023-08-22 RX ORDER — CEFDINIR 300 MG/1
300 CAPSULE ORAL 2 TIMES DAILY
Qty: 14 CAPSULE | Refills: 0 | Status: SHIPPED | OUTPATIENT
Start: 2023-08-22 | End: 2023-08-29

## 2023-08-25 LAB
EBV NA IGG SER QL IA: POSITIVE
EBV VCA IGG SER QL IA: POSITIVE
EBV VCA IGM SER QL IA: NEGATIVE

## 2023-11-07 ENCOUNTER — TELEPHONE (OUTPATIENT)
Facility: CLINIC | Age: 43
End: 2023-11-07

## 2023-11-07 NOTE — TELEPHONE ENCOUNTER
Patient's insurance states it is part of [facility]. Called patient to inform him.

## 2023-11-07 NOTE — TELEPHONE ENCOUNTER
Called to verify insurance coverage. Pt has Tier 1 ([facility]) and Tier 2 [facility] Choice Plus) coverage. Patient can use either one and will cover Dr Rosemary Stockton under [facility] Choice Plus. Spoke to Gerald at [facility] ([phone]) Reference # [id number].

## 2023-11-16 ENCOUNTER — OFFICE VISIT (OUTPATIENT)
Facility: CLINIC | Age: 43
End: 2023-11-16
Payer: COMMERCIAL

## 2023-11-16 VITALS
HEART RATE: 87 BPM | OXYGEN SATURATION: 98 % | SYSTOLIC BLOOD PRESSURE: 130 MMHG | WEIGHT: 244 LBS | HEIGHT: 68 IN | BODY MASS INDEX: 36.98 KG/M2 | DIASTOLIC BLOOD PRESSURE: 84 MMHG

## 2023-11-16 DIAGNOSIS — S46.812A STRAIN OF LEFT TRAPEZIUS MUSCLE, INITIAL ENCOUNTER: Primary | ICD-10-CM

## 2023-11-16 DIAGNOSIS — S76.811A STRAIN OF RIGHT ILIOPSOAS MUSCLE, INITIAL ENCOUNTER: ICD-10-CM

## 2023-11-16 DIAGNOSIS — R06.83 SNORING: ICD-10-CM

## 2023-11-16 PROCEDURE — 3008F BODY MASS INDEX DOCD: CPT | Performed by: FAMILY MEDICINE

## 2023-11-16 PROCEDURE — 3079F DIAST BP 80-89 MM HG: CPT | Performed by: FAMILY MEDICINE

## 2023-11-16 PROCEDURE — 99214 OFFICE O/P EST MOD 30 MIN: CPT | Performed by: FAMILY MEDICINE

## 2023-11-16 PROCEDURE — 3075F SYST BP GE 130 - 139MM HG: CPT | Performed by: FAMILY MEDICINE

## 2023-11-16 RX ORDER — METHOCARBAMOL 500 MG/1
500 TABLET, FILM COATED ORAL 4 TIMES DAILY
Qty: 30 TABLET | Refills: 0 | Status: SHIPPED | OUTPATIENT
Start: 2023-11-16

## 2023-11-26 ENCOUNTER — HOSPITAL ENCOUNTER (OUTPATIENT)
Age: 43
Discharge: HOME | End: 2023-11-26
Payer: COMMERCIAL

## 2023-11-26 ENCOUNTER — APPOINTMENT (OUTPATIENT)
Dept: GENERAL RADIOLOGY | Age: 43
End: 2023-11-26
Attending: PHYSICIAN ASSISTANT
Payer: COMMERCIAL

## 2023-11-26 VITALS
OXYGEN SATURATION: 96 % | SYSTOLIC BLOOD PRESSURE: 149 MMHG | WEIGHT: 240 LBS | HEIGHT: 68 IN | BODY MASS INDEX: 36.37 KG/M2 | DIASTOLIC BLOOD PRESSURE: 96 MMHG | TEMPERATURE: 98 F | RESPIRATION RATE: 22 BRPM | HEART RATE: 87 BPM

## 2023-11-26 DIAGNOSIS — R05.8 COUGH PRODUCTIVE OF PURULENT SPUTUM: Primary | ICD-10-CM

## 2023-11-26 DIAGNOSIS — R09.82 PND (POST-NASAL DRIP): ICD-10-CM

## 2023-11-26 PROCEDURE — 99213 OFFICE O/P EST LOW 20 MIN: CPT

## 2023-11-26 PROCEDURE — 71046 X-RAY EXAM CHEST 2 VIEWS: CPT | Performed by: PHYSICIAN ASSISTANT

## 2023-11-26 PROCEDURE — 99214 OFFICE O/P EST MOD 30 MIN: CPT

## 2023-11-26 RX ORDER — PREDNISONE 20 MG/1
40 TABLET ORAL DAILY
Qty: 6 TABLET | Refills: 0 | Status: SHIPPED | OUTPATIENT
Start: 2023-11-26 | End: 2023-11-29

## 2023-11-26 RX ORDER — AZITHROMYCIN 250 MG/1
TABLET, FILM COATED ORAL
Qty: 6 TABLET | Refills: 0 | Status: SHIPPED | OUTPATIENT
Start: 2023-11-26 | End: 2023-12-01

## 2023-11-26 NOTE — DISCHARGE INSTRUCTIONS
Please return to the ER/clinic if symptoms worsen. Follow-up with your PCP in 24-48 hours as needed. The Decadron will work in your system the next several days. You may start the additional prednisone on day 2 or 3 if symptoms persist.  Sleep more upright. Use Chloraseptic spray to help stop the cough trigger reflex. Get over-the-counter antihistamine daily i.e. Zyrtec. Take the full course antibiotics as prescribed in tandem with a probiotic daily. If symptoms persist or worsen i.e. increasing fevers or shortness of breath go directly to the emergency room. Otherwise follow-up with your primary care physician for further evaluation and treatment.

## 2023-11-26 NOTE — ED INITIAL ASSESSMENT (HPI)
Patient reports cough for just over 1 week, started dry and now worsening and feels congestion in chest with dark yellow phlegm. Also reports headache and fatigue, no nasal congestion. No known fever. Taking robitussin for symptoms and mucinex.

## 2023-11-27 RX ORDER — ALBUTEROL SULFATE 90 UG/1
2 AEROSOL, METERED RESPIRATORY (INHALATION) EVERY 4 HOURS PRN
Qty: 18 G | Refills: 3 | Status: SHIPPED | OUTPATIENT
Start: 2023-11-27

## 2023-11-28 NOTE — TELEPHONE ENCOUNTER
Refill passed per [place] [employer] protocol. Requested Prescriptions   Pending Prescriptions Disp Refills    albuterol 108 (90 Base) MCG/ACT Inhalation Aero Soln 18 g 1     Sig: Inhale 2 puffs into the lungs every 4 (four) hours as needed for Wheezing or Shortness of Breath.        Asthma & COPD Medication Protocol Passed - 11/27/2023  6:41 AM        Passed - In person appointment or virtual visit in the past 6 mos or appointment in next 3 mos     Recent Outpatient Visits              1 week ago Strain of left trapezius muscle, initial encounter    [address], [address] [place], [place]    Office Visit    3 months ago Swollen lymph nodes    [facility], [address] [place] [facility]    Office Visit    6 months ago Encounter for routine adult health examination without abnormal findings    [address] [facility], [address] [place], [place]    Office Visit    7 months ago Encounter for screening mammogram for breast cancer    [address] [facility], [address] Estef[address] Craig MD    Office Visit    10 months ago Exposure to COVID-19 virus    [facility], Daniel Vasquez, SHIRA    Office Visit          Future Appointments         Provider Department Appt Notes    In 2 weeks [address] [facility] MR [facility] (1.5T WIDE) [facility] MRI                      Future Appointments         Provider Department Appt Notes    In 2 weeks [address] [facility] [facility] (1.5T WIDE) [facility] MRI              Recent Outpatient Visits              1 week ago Strain of left trapezius muscle, initial encounter    [address], [address], [address] [place], [place]    Office Visit    3 months ago Swollen lymph nodes    [facility], [address] [place] William Backbone, APRN    Office Visit    6 months ago Encounter for routine adult health examination without abnormal findings    [address], [place]    Office Visit    7 months ago Encounter for screening mammogram for breast cancer    David Robles, [address] Estefany Zheng MD    Office Visit    10 months ago Exposure to COVID-19 virus    [address] Johnathon Birch, [place], [employer]

## 2023-11-29 ENCOUNTER — TELEPHONE (OUTPATIENT)
Facility: CLINIC | Age: 43
End: 2023-11-29

## 2023-11-29 DIAGNOSIS — R05.1 ACUTE COUGH: Primary | ICD-10-CM

## 2023-11-29 RX ORDER — PREDNISONE 20 MG/1
40 TABLET ORAL DAILY
Qty: 4 TABLET | Refills: 0 | Status: SHIPPED | OUTPATIENT
Start: 2023-11-29 | End: 2023-12-01

## 2023-11-29 RX ORDER — BENZONATATE 200 MG/1
200 CAPSULE ORAL 3 TIMES DAILY PRN
Qty: 30 CAPSULE | Refills: 0 | Status: SHIPPED | OUTPATIENT
Start: 2023-11-29

## 2023-11-29 NOTE — TELEPHONE ENCOUNTER
Paged by patient due to cough and \"heavy breathing. \" Seen in the IC on 11/2 and was prescribed Azithromycin and Prednisone for 3 days. States the cough does not bother her unless she talks a lot. Feels more chest congestion and fatigue as well. Has been taking Albuterol every 2-3 hours, but not always helping as much. She had a low-grade fever as well. Has been coughing up darker phlegm. Her mom notices her breathing is more labored and heavier. Denies any history of asthma. She has not tested for Covid. Symptoms likely consistent with viral bronchitis and will extend Prednisone 40 mg daily for 2 more days for a total of 5 days. Also sent Benzonatate for cough and ordered Covid test. Reviewed warning signs and ED precautions in great detail.

## 2023-11-30 ENCOUNTER — PATIENT MESSAGE (OUTPATIENT)
Facility: CLINIC | Age: 43
End: 2023-11-30

## 2023-12-08 ENCOUNTER — TELEPHONE (OUTPATIENT)
Facility: CLINIC | Age: 43
End: 2023-12-08

## 2023-12-08 NOTE — TELEPHONE ENCOUNTER
Pt states she was treated for bronchitis recently and was feeling better until today. Pt state she feels fatigued. Pt went to the dentist yesterday and \"he told me my thyroid was enlarged and I need to see my PCP\"    Pt states cough is much better and pt denies SOB, wheezing, fever, chest pain. Pt asking if she can be added to Dr Sherlyn Kirkpatrick schedule to check her thyroid 12/923.   Please advise

## 2023-12-08 NOTE — TELEPHONE ENCOUNTER
Appointment made for 12/13/2023 at 5pm with Dr Katherine Cordero in [address] [place] [address] Dr PICHARDO. Patient still has the cough and missed work on Tuesday 12/5/2023, Wednesday 12/6/2023, and Friday 12/8/2023. Needs an updated note for work. Please advise.

## 2023-12-10 NOTE — TELEPHONE ENCOUNTER
Pt has not view letter in [place]. Contacted patient and advised. She verbalized understanding.

## 2023-12-13 ENCOUNTER — OFFICE VISIT (OUTPATIENT)
Facility: CLINIC | Age: 43
End: 2023-12-13
Payer: COMMERCIAL

## 2023-12-13 VITALS
HEART RATE: 85 BPM | SYSTOLIC BLOOD PRESSURE: 136 MMHG | DIASTOLIC BLOOD PRESSURE: 88 MMHG | WEIGHT: 245 LBS | OXYGEN SATURATION: 97 % | BODY MASS INDEX: 37.13 KG/M2 | HEIGHT: 68 IN

## 2023-12-13 DIAGNOSIS — R05.1 ACUTE COUGH: Primary | ICD-10-CM

## 2023-12-13 PROCEDURE — 99213 OFFICE O/P EST LOW 20 MIN: CPT | Performed by: FAMILY MEDICINE

## 2023-12-13 PROCEDURE — 3079F DIAST BP 80-89 MM HG: CPT | Performed by: FAMILY MEDICINE

## 2023-12-13 PROCEDURE — 3008F BODY MASS INDEX DOCD: CPT | Performed by: FAMILY MEDICINE

## 2023-12-13 PROCEDURE — 3075F SYST BP GE 130 - 139MM HG: CPT | Performed by: FAMILY MEDICINE

## 2023-12-13 RX ORDER — FLUTICASONE PROPIONATE 110 UG/1
1 AEROSOL, METERED RESPIRATORY (INHALATION) 2 TIMES DAILY
Qty: 12 G | Refills: 0 | Status: SHIPPED | OUTPATIENT
Start: 2023-12-13

## 2024-01-31 ENCOUNTER — OFFICE VISIT (OUTPATIENT)
Dept: SLEEP CENTER | Age: 44
End: 2024-01-31
Attending: FAMILY MEDICINE
Payer: COMMERCIAL

## 2024-01-31 DIAGNOSIS — R06.83 SNORING: Primary | ICD-10-CM

## 2024-01-31 PROCEDURE — 95810 POLYSOM 6/> YRS 4/> PARAM: CPT

## 2024-02-02 DIAGNOSIS — G47.33 OSA (OBSTRUCTIVE SLEEP APNEA): Primary | ICD-10-CM

## 2024-02-14 ENCOUNTER — NURSE TRIAGE (OUTPATIENT)
Dept: OBGYN CLINIC | Facility: CLINIC | Age: 44
End: 2024-02-14

## 2024-02-14 NOTE — TELEPHONE ENCOUNTER
Please triage further and help her get in touch with Lucho Prince for level of needs assessment.

## 2024-02-14 NOTE — TELEPHONE ENCOUNTER
Called mom but request to speak with pt.  Patient there, speaking with pt, feeling depressed mild, denies thought of harm to self/others. Pt laying in bed for a week, has therapist needs to f/u, taking meds for depression.  Not able to get out of bed due to tried/sleepy.  Reviewed chart KEVIN,    Pt waiting for CPAP titration call.  Informed of my chart message of 02/03/2024 and number to call to schedule.  Informed will route to Dr. Aguilar, pt agrees.      Last visit 04/27/2023 LC    A/P: Patient is 42 year old female      1. Encounter for screening mammogram for breast cancer     2. Papanicolaou smear of cervix with low grade squamous intraepithelial lesion (LGSIL)     3. Encounter for gynecological examination without abnormal finding  - HIV AG AB COMBO; Future  - HEPATITIS B SURFACE ANTIGEN; Future  - T PALLIDUM SCREENING CASCADE; Future  - CHLAMYDIA/GONOCOCCUS, PHYLLIS; Future  - HCV ANTIBODY; Future     4. Family history of breast cancer  - MRI BREAST (W+WO) W/CAD BILAT (CPT=77049); Future        Lana Benson MD

## 2024-02-14 NOTE — TELEPHONE ENCOUNTER
Mom is calling stating the patient is having a situation and would like to speak to someone. Did not want to state reason. Mom asked to call her back, mom is on the verbal release for general and sensitive information.

## 2024-02-14 NOTE — TELEPHONE ENCOUNTER
Action Requested: Summary for Provider     []  Critical Lab, Recommendations Needed  [] Need Additional Advice  []   FYI    []   Need Orders  [] Need Medications Sent to Pharmacy  []  Other     SUMMARY: Per protocol, patient should be seen in office within 3 days. Rn provided number for NICHELLE crisis line and patient is open to calling.     Reason for call: Other  Onset: Data Unavailable    Spoke to mom and patient. RN completed CSSR and it was a zero. Before speaking to patient, mom said that patient is not sleeping and not eating much. When Rn spoke to patient, she said she is very tired but can't sleep and is not sure if it is due to the KEVIN or depression.     RN provided crisis line number for NICHELLE and patient is open to calling to see how we can best help her. She will call back if she needs anything.     Dr. Aguilar, MICHELAI.     Reason for Disposition   Depression is worsening (e.g.,sleeping poorly, less able to do activities of daily living)    Protocols used: Depression-A-OH

## 2024-02-20 ENCOUNTER — TELEPHONE (OUTPATIENT)
Dept: SLEEP CENTER | Age: 44
End: 2024-02-20

## 2024-02-20 ENCOUNTER — PATIENT MESSAGE (OUTPATIENT)
Facility: CLINIC | Age: 44
End: 2024-02-20

## 2024-02-20 DIAGNOSIS — G47.33 OSA (OBSTRUCTIVE SLEEP APNEA): Primary | ICD-10-CM

## 2024-02-20 NOTE — TELEPHONE ENCOUNTER
Marisol Lee, RN 2/20/2024 2:09 PM CST        ----- Message -----  From: Latosha London  Sent: 2/20/2024 2:07 PM CST  To: Em Rn Triage  Subject: FMLA paperwork     I sure will thanks

## 2024-02-20 NOTE — TELEPHONE ENCOUNTER
Stat order signed, and would call to see if this expedites the visit or if she can go elsewhere.

## 2024-02-21 NOTE — TELEPHONE ENCOUNTER
Routed to Triage support for assistance in scheduling sooner appt for pt, see MD message, thanks.  Also [employer] message with MD recommendation sent to pt..

## 2024-02-22 ENCOUNTER — ORDER TRANSCRIPTION (OUTPATIENT)
Dept: SLEEP CENTER | Age: 44
End: 2024-02-22

## 2024-02-29 ENCOUNTER — TELEPHONE (OUTPATIENT)
Facility: CLINIC | Age: 44
End: 2024-02-29

## 2024-02-29 NOTE — TELEPHONE ENCOUNTER
Disab received with valid auth. [employer] rep called to confirm we received form. Due to having valid auth on file, did confirm to rep we have disab form and informed of our 10-15 business turnaround time.  Logged for processing

## 2024-03-11 ENCOUNTER — PATIENT MESSAGE (OUTPATIENT)
Facility: CLINIC | Age: 44
End: 2024-03-11

## 2024-03-12 NOTE — TELEPHONE ENCOUNTER
[employer] message sent with instructions per protocol.     Future Appointments   Date Time Provider Department Center   3/12/2024  8:30 AM ADULT PSYCH IOP - [address]   3/13/2024  8:30 AM ADULT PSYCH IOP - [address] [address]   3/14/2024  8:30 AM ADULT PSYCH IOP - [facility] [address] [address]   3/15/2024  8:30 AM ADULT PSYCH IOP - [address] [address]   3/15/2024  9:15 PM SCHEDULE BY DATE [facility]   3/18/2024  8:30 AM ADULT PSYCH IOP - [address] [address]   3/19/2024  8:30 AM ADULT PSYCH IOP - [address] [address]   3/20/2024  8:30 AM ADULT PSYCH IOP - [address] [address] [address]   3/21/2024  8:30 AM ADULT PSYCH IOP - [address] [address] [address]   3/22/2024  8:30 AM ADULT PSYCH IOP - [facility] [address] [address]   3/25/2024  8:30 AM ADULT PSYCH IOP - [facility] [address]   3/26/2024  8:30 AM ADULT PSYCH IOP - [facility] [address]   3/27/2024  8:30 AM ADULT PSYCH IOP - [facility] [address]   3/28/2024  8:30 AM ADULT PSYCH IOP - [address] [address]   3/29/2024  8:30 AM ADULT PSYCH IOP - [address] [address]   4/1/2024  8:30 AM ADULT PSYCH IOP - [address] [address] [address]   4/2/2024  8:30 AM ADULT PSYCH IOP - [address] [address] [address]   4/3/2024  8:30 AM ADULT PSYCH IOP - [address] [address]   4/4/2024  8:30 AM ADULT PSYCH IOP - [address] [address]   4/5/2024  8:30 AM ADULT PSYCH IOP - [address] [address]   4/8/2024  8:30 AM ADULT PSYCH IOP - [address] [address]   4/9/2024  8:30 AM ADULT PSYCH IOP - [address]           From: Latosha London  To: Geovanna Aguilar  Sent: 3/11/2024  4:25 PM CDT  Subject: Elevated BP     Hello I’m currently in the [facility] outpatient program. When the nurse  checked my blood pressure it was elevated I believe it was in my chart. Today I checked again my bp was 143/93.

## 2024-03-15 ENCOUNTER — OFFICE VISIT (OUTPATIENT)
Dept: SLEEP CENTER | Age: 44
End: 2024-03-15
Attending: INTERNAL MEDICINE
Payer: COMMERCIAL

## 2024-03-15 DIAGNOSIS — G47.33 OSA (OBSTRUCTIVE SLEEP APNEA): Primary | ICD-10-CM

## 2024-03-15 PROCEDURE — 95811 POLYSOM 6/>YRS CPAP 4/> PARM: CPT

## 2024-03-22 ENCOUNTER — TELEPHONE (OUTPATIENT)
Dept: OBGYN CLINIC | Facility: CLINIC | Age: 44
End: 2024-03-22

## 2024-03-22 DIAGNOSIS — G47.33 OSA (OBSTRUCTIVE SLEEP APNEA): Primary | ICD-10-CM

## 2024-03-22 PROBLEM — F33.2 SEVERE RECURRENT MAJOR DEPRESSION WITHOUT PSYCHOTIC FEATURES (HCC): Status: ACTIVE | Noted: 2024-03-22

## 2024-03-22 PROBLEM — F41.1 GAD (GENERALIZED ANXIETY DISORDER): Status: ACTIVE | Noted: 2024-03-22

## 2024-03-22 NOTE — TELEPHONE ENCOUNTER
Pt called stated she completed the sleep study scan ordered by Dr. Aguilar.    The results showed she will need a Cpap device.    Wants to know what are the next steps to obtain the device.    Please advise

## 2024-03-22 NOTE — TELEPHONE ENCOUNTER
Order placed for CPAP machine & supplies. Please let pt know. She should reach back out in 2 weeks if she does not hear from our office regarding machine.

## 2024-03-27 ENCOUNTER — TELEPHONE (OUTPATIENT)
Facility: CLINIC | Age: 44
End: 2024-03-27

## 2024-03-27 NOTE — TELEPHONE ENCOUNTER
Jeanette from [employer] [facility] [employer] will be faxing a form over to Dr Aguilar. Will need the order for the sleep study, base line sleep study and notes prior to the sleep study.

## 2024-03-28 NOTE — TELEPHONE ENCOUNTER
Triage support or onstaff please advise.     Pt calling to know the status of the form that was faxed to the office from [employer]. Has it been faxed back?

## 2024-03-29 ENCOUNTER — TELEPHONE (OUTPATIENT)
Facility: CLINIC | Age: 44
End: 2024-03-29

## 2024-03-29 NOTE — TELEPHONE ENCOUNTER
Dr. Aguilar and ONEAL Ponce-- [facility] is only covered at 50% for patient.    Spoke to Jeanette from [facility] (name and  of patient verified).   She reports [facility] is difficult to work with, long process to seek approval. They did get back to her and [facility] is only covered at 50%.  Eloise Aguilera, [place] may be covered, she is unsure.

## 2024-03-29 NOTE — TELEPHONE ENCOUNTER
I have faxed the order the sleep study the sleep titration notes from Dr. Aguilar and the facesheet for patient to [place] [facility] [employer] [facility] at [phone]

## 2024-03-29 NOTE — TELEPHONE ENCOUNTER
Left message for call back. Voicemail did not state confidential so RN did not leave detailed message.

## 2024-04-01 NOTE — TELEPHONE ENCOUNTER
Spoke to Jeanette from [facility] [employer] [NOT [facility]] states they are a Tier 2 DME company with patient's insurance; she states patient was going to call her insurance provider who is in network and preferred [tier 1] for her and she also instructed patient to call our office once that information is obtained for DME preferred provider, so order for DME can be submitted to the advised DME vendor.     [c-crowd message was sent to patient as well]

## 2024-04-29 NOTE — TELEPHONE ENCOUNTER
Office Notes from 5/25/24    Will treat intertrigo under breasts with clotrimazole-betamethasone cream twice daily x 2 weeks. Also to use Vaseline as needed to prevent future flare-ups and keep area clean and dry.     My chart message sent to clarify.

## 2024-04-30 RX ORDER — CLOTRIMAZOLE AND BETAMETHASONE DIPROPIONATE 10; .64 MG/G; MG/G
1 CREAM TOPICAL 2 TIMES DAILY
Qty: 60 G | Refills: 3 | Status: SHIPPED | OUTPATIENT
Start: 2024-04-30

## 2024-04-30 NOTE — TELEPHONE ENCOUNTER
Protocol Failed/ No Protocol    Requested Prescriptions   Pending Prescriptions Disp Refills    clotrimazole-betamethasone 1-0.05 % External Cream 60 g 0     Sig: Apply 1 Application topically 2 (two) times daily.       There is no refill protocol information for this order            Recent Outpatient Visits              1 month ago KEVIN (obstructive sleep apnea)    [facility]    Office Visit    2 months ago Snoring    [facility]    Office Visit    4 months ago Acute cough    [facility] Geovanna Aguilar DO    Office Visit    5 months ago Strain of left trapezius muscle, initial encounter    [facility] Geovanna Aguilar DO    Office Visit    8 months ago Swollen lymph nodes    [facility], [address] [place] [address] Myrtle Pierre APRN    Office Visit

## 2024-05-01 ENCOUNTER — NURSE TRIAGE (OUTPATIENT)
Facility: CLINIC | Age: 44
End: 2024-05-01

## 2024-05-01 NOTE — TELEPHONE ENCOUNTER
Recommend she decrease caffeine, sodium, alcohol, and NSAID intake, but please also schedule her in next available SDA. Should call back if BP worsens.

## 2024-05-01 NOTE — TELEPHONE ENCOUNTER
Please reply to pool: EM RN TRIAGE  Action Requested: Summary for Provider     []  Critical Lab, Recommendations Needed  [x] Need Additional Advice  []   FYI    []   Need Orders  [] Need Medications Sent to Pharmacy  []  Other     SUMMARY: Patient contacts clinic reporting elevated BP readings.  Today 155/88.  Previous readings in office have been elevated.  148/82.  Occurring over the last month.  She has an associated headache and occasional nausea.  Denies blurred vision, dizziness, lightheadedness, chest pain, shortness of breath, edema, facial or extremity weakness.  She does not currently take medication for her blood pressure.  No visits to offer this week, only SDA available through the end of the month.  Dr. Aguilar please advise.     Reason for call: Blood Pressure  Onset: Data Unavailable                       Reason for Disposition   Systolic BP >= 130 OR Diastolic >= 80, and is taking BP medications    Protocols used: Blood Pressure - High-A-OH

## 2024-05-02 ENCOUNTER — EMPLOYEE HEALTH (OUTPATIENT)
Dept: OTHER | Facility: HOSPITAL | Age: 44
End: 2024-05-02
Attending: PREVENTIVE MEDICINE

## 2024-05-02 DIAGNOSIS — Z11.1 SCREENING-PULMONARY TB: Primary | ICD-10-CM

## 2024-05-02 PROCEDURE — 86480 TB TEST CELL IMMUN MEASURE: CPT

## 2024-05-02 NOTE — TELEPHONE ENCOUNTER
Patient (name and  verified) calling back and instructed on providers message below. Patient verbalized understanding and agrees to plan.    Scheduled with next SDA and also with patient's availability, 24 at 6:30pm.    Patient was instructed that if symptoms worsen to be seen in the ER/IC for evaluation. Verbalized understanding.

## 2024-05-04 LAB
M TB IFN-G CD4+ T-CELLS BLD-ACNC: 0.04 IU/ML
M TB TUBERC IFN-G BLD QL: NEGATIVE
M TB TUBERC IGNF/MITOGEN IGNF CONTROL: >10 IU/ML
QFT TB1 AG MINUS NIL: 0.14 IU/ML
QFT TB2 AG MINUS NIL: 0.12 IU/ML

## 2024-05-09 ENCOUNTER — OFFICE VISIT (OUTPATIENT)
Facility: CLINIC | Age: 44
End: 2024-05-09
Payer: COMMERCIAL

## 2024-05-09 VITALS
DIASTOLIC BLOOD PRESSURE: 96 MMHG | BODY MASS INDEX: 37.59 KG/M2 | WEIGHT: 248 LBS | SYSTOLIC BLOOD PRESSURE: 130 MMHG | HEIGHT: 68 IN | OXYGEN SATURATION: 97 % | HEART RATE: 90 BPM

## 2024-05-09 DIAGNOSIS — Z00.00 ROUTINE GENERAL MEDICAL EXAMINATION AT A HEALTH CARE FACILITY: ICD-10-CM

## 2024-05-09 DIAGNOSIS — I10 HYPERTENSION WITH GOAL BLOOD PRESSURE LESS THAN 140/90: Primary | ICD-10-CM

## 2024-05-09 DIAGNOSIS — Z12.31 VISIT FOR SCREENING MAMMOGRAM: ICD-10-CM

## 2024-05-09 PROBLEM — F33.2 SEVERE RECURRENT MAJOR DEPRESSION WITHOUT PSYCHOTIC FEATURES (HCC): Status: RESOLVED | Noted: 2024-03-22 | Resolved: 2024-05-09

## 2024-05-09 PROBLEM — F41.1 GAD (GENERALIZED ANXIETY DISORDER): Status: RESOLVED | Noted: 2024-03-22 | Resolved: 2024-05-09

## 2024-05-09 PROCEDURE — 99214 OFFICE O/P EST MOD 30 MIN: CPT | Performed by: FAMILY MEDICINE

## 2024-05-09 RX ORDER — CHOLECALCIFEROL (VITAMIN D3) 25 MCG
1 TABLET,CHEWABLE ORAL DAILY
COMMUNITY

## 2024-05-09 RX ORDER — AMLODIPINE BESYLATE 10 MG/1
10 TABLET ORAL NIGHTLY
Qty: 90 TABLET | Refills: 0 | Status: SHIPPED | OUTPATIENT
Start: 2024-05-09

## 2024-05-09 NOTE — PROGRESS NOTES
CC:    Chief Complaint   Patient presents with    Blood Pressure       HPI: 43 year old female here with concerns for elevated blood pressure.  Has been working as a mental health intake associate through [employer], and her job started this week.  She is very excited about this job, and feels less stressed.  Her blood pressure has been trending higher, and last Wednesday she developed a more severe headache that prompted her to check her blood pressure.  Tried sinus medication and Tylenol, but that did not help.  Noticed her blood pressure was 150/90 on average.   She only drinks about a cup of coffee every other day, and rarely drinks alcohol.  She has been eating well and not eating out very much.   Also watches her intake of sodium.   She did start wearing her CPAP machine nightly about one month ago, and it has been helping her sleep.   She started taking magnesium to see if it would help her blood pressure.     ROS:  General:  No fever, no fatigue, no weight changes  HEENT:  Denies congestion or nasal discharge, no vision changes   Cardio:  No chest pain, intermittent palpitations   Pulmonary:  No cough, no SOB  GI:  No N/V/D  Dermatologic:  No rashes  Neuro: intermittent headaches   Psych: improved depression and anxiety     Past Medical History:    Allergic rhinitis    Anxiety    BRCA negative    Colitis    Depression    most of her life, no suicidal ideation. No psychiatrist    Family history of breast cancer    in mom, Pat aunt and Mat great aunt, pt BRCA negative, but Darlene-Hoda = Javier lifetime risk was 24%.    History of use of contraceptive intrauterine device (IUD)    Mirena IUD 2016-08/08/2018    Human papilloma virus infection    Low grade squamous intraepith lesion on cytologic smear cervix (lgsil)    Migraine    reports weekly migraines    Obesity    Ovarian cyst    2009 was evaluated for ovarian cyst    Pre-diabetes    PCP told her in 2017    Sleep apnea       Social History      Socioeconomic History    Marital status: Single     Spouse name: Not on file    Number of children: Not on file    Years of education: Not on file    Highest education level: Not on file   Occupational History    Occupation: Customer service   Tobacco Use    Smoking status: Former     Current packs/day: 0.00     Types: Cigarettes     Quit date: 2009     Years since quittin.4    Smokeless tobacco: Never   Vaping Use    Vaping status: Some Days    Substances: THC   Substance and Sexual Activity    Alcohol use: Yes     Alcohol/week: 1.0 standard drink of alcohol     Types: 1 Glasses of wine per week     Comment: Social drinker - once a month    Drug use: Not Currently     Types: Cannabis     Comment: several times a week - vape, last time use month ago per patient    Sexual activity: Not Currently     Partners: Male     Birth control/protection: Mirena, I.U.D.   Other Topics Concern     Service Not Asked    Blood Transfusions No    Caffeine Concern No    Occupational Exposure Not Asked    Hobby Hazards Not Asked    Sleep Concern Not Asked    Stress Concern No    Weight Concern Yes    Special Diet No    Back Care Not Asked    Exercise No    Bike Helmet Not Asked    Seat Belt No    Self-Exams Not Asked   Social History Narrative    Live with mother and son     no H/O abuse        Feels safe in current situation.     Social Determinants of Health     Financial Resource Strain: Not on file   Food Insecurity: Not on file   Transportation Needs: Not on file   Physical Activity: Not on file   Stress: Not on file   Social Connections: Not on file   Housing Stability: Not on file       Current Outpatient Medications   Medication Sig Dispense Refill    prenatal vitamin with DHA 27-0.8-228 MG Oral Cap Take 1 capsule by mouth daily.      clotrimazole-betamethasone 1-0.05 % External Cream Apply 1 Application topically 2 (two) times daily. 60 g 3    buPROPion  MG Oral Tablet 24 Hr Take 1 tablet (150 mg  total) by mouth daily. Take 150 mg daily along with 300 mg daily for 450 mg total daily 90 tablet 0    clonazePAM 0.5 MG Oral Tab Take 1 tablet (0.5 mg total) by mouth daily as needed for Anxiety. 30 tablet 1    BUPROPION  MG Oral Tablet 24 Hr TAKE 1 TABLET(300 MG) BY MOUTH EVERY MORNING 90 tablet 0    FLUOXETINE 20 MG Oral Cap TAKE 3 CAPSULES(60 MG) BY MOUTH DAILY 270 capsule 0    Multiple Vitamins-Minerals (BIOTIN PLUS/CALCIUM/VIT D3) Oral Tab Take by mouth.      fluticasone propionate 110 MCG/ACT Inhalation Aerosol Inhale 1 puff into the lungs 2 (two) times daily. 12 g 0    albuterol 108 (90 Base) MCG/ACT Inhalation Aero Soln Inhale 2 puffs into the lungs every 4 (four) hours as needed for Wheezing or Shortness of Breath. 18 g 3    hydrOXYzine 25 MG Oral Tab Take 0.5-1 tablets (12.5-25 mg total) by mouth nightly as needed for Anxiety. 30 tablet 1    Cholecalciferol 125 MCG (5000 UT) Oral Tab Take 1 tablet (5,000 Units total) by mouth daily.      Omega-3 Fatty Acids (FISH OIL OR) Take by mouth.      Multiple Vitamin (DAILY VALUE MULTIVITAMIN) Oral Tab Take by mouth.      levonorgestrel (MIRENA, 52 MG,) 20 MCG/24HR Intrauterine IUD 20 mcg (1 each total) by Intrauterine route once.         Dust mite extract      Vitals:   Vitals:    05/09/24 1830   BP: (!) 138/100   Pulse: 90   SpO2: 97%   Weight: 248 lb (112.5 kg)   Height: 5' 8\" (1.727 m)       Body mass index is 37.71 kg/m².    Physical:  General:  Alert, appropriate, no acute distress   HEENT: supple, no carotid bruit or JVD, no lymphadenopathy   Cardio:  RRR, no murmurs, S1, S2  Pulmonary:  Clear bilaterally, good air entry  Dermatologic:  No rashes or lesions  Ext: no cyanosis or edema, 2+ radial and dorsalis pedis pulses bilaterally     Assessment and Plan: 43 year old female here to discuss high blood pressure.    1. Hypertension with goal blood pressure less than 140/90    - Blood pressure has been consistently elevated with concurrent headaches so  will start medication  - Start Amlodipine 10 mg nightly, and send message with readings in the next 2 weeks  - Also check baseline blood work, EKG, and microalbumin:Cr ratio for screening     2. Routine general medical examination at a health care facility    - EKG 12 Lead to be performed at [facility]; Future  - Lipid Panel [E]; Future  - Hemoglobin A1C (Glycohemoglobin) [E]; Future  - Comp Metabolic Panel (14) [E]; Future  - Microalb/Creat Ratio, Random Urine [E]; Future  - CBC W Differential W Platelet [E]; Future    3. Visit for screening mammogram    - [facility] NABEEL 2D+3D SCREENING BILAT (CPT=77067/28583); Future      Geovanna Aguilar DO  05/09/24  6:39 PM

## 2024-06-22 ENCOUNTER — PATIENT MESSAGE (OUTPATIENT)
Facility: CLINIC | Age: 44
End: 2024-06-22

## 2024-06-22 ENCOUNTER — HOSPITAL ENCOUNTER (OUTPATIENT)
Dept: MAMMOGRAPHY | Age: 44
Discharge: HOME | End: 2024-06-22
Attending: FAMILY MEDICINE

## 2024-06-22 DIAGNOSIS — Z12.31 VISIT FOR SCREENING MAMMOGRAM: ICD-10-CM

## 2024-06-22 DIAGNOSIS — G47.33 OSA (OBSTRUCTIVE SLEEP APNEA): Primary | ICD-10-CM

## 2024-06-22 PROCEDURE — 77067 SCR MAMMO BI INCL CAD: CPT | Performed by: FAMILY MEDICINE

## 2024-06-22 PROCEDURE — 77063 BREAST TOMOSYNTHESIS BI: CPT | Performed by: FAMILY MEDICINE

## 2024-06-23 NOTE — TELEPHONE ENCOUNTER
From: Latosha London  To: Geovanna Aguilar  Sent: 6/22/2024 12:42 PM CDT  Subject: New Cpap machine order     Hello,     My new [facility] started for [facility] with [employer]. I need to have a new referral for a new cpap machine. The current Dme I use doesn’t accept [employer] insurance and I have to return the equipment.

## 2024-07-30 ENCOUNTER — OFFICE VISIT (OUTPATIENT)
Dept: OTOLARYNGOLOGY | Facility: CLINIC | Age: 44
End: 2024-07-30
Payer: COMMERCIAL

## 2024-07-30 DIAGNOSIS — R09.81 NASAL CONGESTION: ICD-10-CM

## 2024-07-30 DIAGNOSIS — R44.8 FACIAL PRESSURE: ICD-10-CM

## 2024-07-30 DIAGNOSIS — M26.609 TMJ (TEMPOROMANDIBULAR JOINT DISORDER): Primary | ICD-10-CM

## 2024-07-30 DIAGNOSIS — J34.3 HYPERTROPHY OF BOTH INFERIOR NASAL TURBINATES: ICD-10-CM

## 2024-07-30 DIAGNOSIS — J30.9 CHRONIC ALLERGIC RHINITIS: ICD-10-CM

## 2024-07-30 PROCEDURE — 99204 OFFICE O/P NEW MOD 45 MIN: CPT | Performed by: STUDENT IN AN ORGANIZED HEALTH CARE EDUCATION/TRAINING PROGRAM

## 2024-07-30 PROCEDURE — 31231 NASAL ENDOSCOPY DX: CPT | Performed by: STUDENT IN AN ORGANIZED HEALTH CARE EDUCATION/TRAINING PROGRAM

## 2024-07-30 RX ORDER — AZELASTINE 1 MG/ML
2 SPRAY, METERED NASAL 2 TIMES DAILY
Qty: 30 ML | Refills: 0 | Status: SHIPPED | OUTPATIENT
Start: 2024-07-30

## 2024-07-30 RX ORDER — MONTELUKAST SODIUM 10 MG/1
10 TABLET ORAL NIGHTLY
Qty: 30 TABLET | Refills: 3 | Status: SHIPPED | OUTPATIENT
Start: 2024-07-30

## 2024-07-30 NOTE — PROGRESS NOTES
Latosha London is a 43 year old female.   Chief Complaint   Patient presents with    Sinus Problem     Patient is here for constantly nasal congestion and pressure on the fore head  reports she was diagnosed recently with asleep apnea x 2 months.     HPI:   43-year-old presents with chronic nasal congestion, watery itchy eyes and reports pressure in her midface.  Was also recently diagnosed with moderate to severe sleep apnea uses CPAP.  Uses a topical nasal steroid as well as Zyrtec    Current Outpatient Medications   Medication Sig Dispense Refill    montelukast 10 MG Oral Tab Take 1 tablet (10 mg total) by mouth nightly. 30 tablet 3    azelastine 0.1 % Nasal Solution 2 sprays by Nasal route 2 (two) times daily. 30 mL 0    buPROPion  MG Oral Tablet 24 Hr Take 1 tablet (300 mg total) by mouth every morning. 90 tablet 0    buPROPion  MG Oral Tablet 24 Hr Take 1 tablet (150 mg total) by mouth daily. Take 150 mg daily along with 300 mg daily for 450 mg total daily 90 tablet 0    FLUoxetine 20 MG Oral Cap Take 3 capsules (60 mg total) by mouth daily. 270 capsule 0    CLONAZEPAM 0.5 MG Oral Tab TAKE 1 TABLET(0.5 MG) BY MOUTH DAILY AS NEEDED FOR ANXIETY 30 tablet 0    prenatal vitamin with DHA 27-0.8-228 MG Oral Cap Take 1 capsule by mouth daily.      amLODIPine 10 MG Oral Tab Take 1 tablet (10 mg total) by mouth at bedtime. 90 tablet 0    clotrimazole-betamethasone 1-0.05 % External Cream Apply 1 Application topically 2 (two) times daily. 60 g 3    Multiple Vitamins-Minerals (BIOTIN PLUS/CALCIUM/VIT D3) Oral Tab Take by mouth.      fluticasone propionate 110 MCG/ACT Inhalation Aerosol Inhale 1 puff into the lungs 2 (two) times daily. 12 g 0    albuterol 108 (90 Base) MCG/ACT Inhalation Aero Soln Inhale 2 puffs into the lungs every 4 (four) hours as needed for Wheezing or Shortness of Breath. 18 g 3    Cholecalciferol 125 MCG (5000 UT) Oral Tab Take 1 tablet (5,000 Units total) by mouth daily.      Omega-3  Fatty Acids (FISH OIL OR) Take by mouth.      Multiple Vitamin (DAILY VALUE MULTIVITAMIN) Oral Tab Take by mouth.      levonorgestrel (MIRENA, 52 MG,) 20 MCG/24HR Intrauterine IUD 20 mcg (1 each total) by Intrauterine route once.        Past Medical History:    Allergic rhinitis    Anxiety    BRCA negative    Colitis    Depression    most of her life, no suicidal ideation. No psychiatrist    Family history of breast cancer    in mom, Pat aunt and Mat great aunt, pt BRCA negative, but Tyrer-Hoda = Javier lifetime risk was 24%.    History of use of contraceptive intrauterine device (IUD)    Mirena IUD -2018    Human papilloma virus infection    Low grade squamous intraepith lesion on cytologic smear cervix (lgsil)    Migraine    reports weekly migraines    Obesity    Ovarian cyst     was evaluated for ovarian cyst    Pre-diabetes    PCP told her in 2017    Sleep apnea      Social History:  Social History     Socioeconomic History    Marital status: Single   Occupational History    Occupation: [employer]   Tobacco Use    Smoking status: Former     Current packs/day: 0.00     Types: Cigarettes     Quit date: 2009     Years since quittin.6    Smokeless tobacco: Never   Vaping Use    Vaping status: Some Days    Substances: THC   Substance and Sexual Activity    Alcohol use: Yes     Alcohol/week: 1.0 standard drink of alcohol     Types: 1 Glasses of wine per week     Comment: Social drinker - once a month    Drug use: Not Currently     Types: Cannabis     Comment: several times a week - vape, last time use month ago per patient    Sexual activity: Not Currently     Partners: Male     Birth control/protection: Mirena, I.U.D.   Other Topics Concern    Blood Transfusions No    Caffeine Concern No    Stress Concern No    Weight Concern Yes    Special Diet No    Exercise No    Seat Belt No   Social History Narrative    Live with mother and son     no H/O abuse        Feels safe in current  situation.      Past Surgical History:   Procedure Laterality Date    Colposcopy, cervix w/upper adjacent vagina; w/biopsy(s), cervix  01/11/2018    Leep  09/05/2019    for SANCHEZ 1    Other surgical history      wislisa teecony impacted         EXAM:   LMP 04/18/2024 (Exact Date)     System Details   Skin Inspection - Normal.   Constitutional Overall appearance - Normal.   Head/Face Symmetric, TMJ tenderness not present    Eyes EOMI, PERRL   Right ear:  Canal clear, TM intact, no JOE   Left ear:  Canal clear, TM intact, no JOE   Nose: Septum midline, inferior turbinates not enlarged, nasal valves without collapse    Oral cavity/Oropharynx: No lesions or masses on inspection or palpation, tonsils symmetric    Neck: Soft without LAD, thyroid not enlarged  Voice clear/ no stridor   Other:      SCOPES AND PROCEDURES:     Nasal Endoscopy Procedure Note     Due to inability for adequate examination of the nose and nasopharynx and need for magnification to perform the examination, endoscopy was performed.  Risks and benefits were discussed with patient/family and they have given verbal consent to proceed.    Pre-operative Diagnosis:   1. TMJ (temporomandibular joint disorder)    2. Chronic allergic rhinitis    3. Hypertrophy of both inferior nasal turbinates    4. Facial pressure    5. Nasal congestion        Post-operative Diagnosis: Same    Procedure: Diagnostic nasal endoscopy    Anesthesia: Topical anesthetic [place]     Surgeon Young Cardona MD    EBL: 0cc    Procedure Detail & Findings:     After placement of topical anesthetic intranasally the endoscope was inserted into each nares and driven through the nasal cavity into the nasopharynx. The following findings were noted:    Septum: Midline  Inferior turbinates: Inferior turbinate hypertrophy on both sides  Middle meatus: Patent  Middle turbinates: Normal  Purulence: None noted  Polyps: None noted  Nasopharynx and eustachian tube: No masses  Other: The middle and superior  meatus, the turbinates, and the spheno-ethmoid recess were inspected and seen to be without significant abnormal findings.     Condition: Stable    Complications: Patient tolerated the procedure well with no immediate complication.    Young Cardona MD    AUDIOGRAM AND IMAGING:         IMPRESSION:   1. TMJ (temporomandibular joint disorder)    2. Chronic allergic rhinitis    3. Hypertrophy of both inferior nasal turbinates    4. Facial pressure    5. Nasal congestion       Recommendations:  -Nasal endoscopy without sinusitis although with inferior turbinate hypertrophy and thin allergic appearing mucin  -Also has scalloping of her tongue and a history of bruxism which may be the cause of her ear pressure  -Her sinonasal symptoms may be allergic related.  Will trial Astelin nasal spray and add Singulair to her regimen along with antihistamine.  Will avoid decongestions due to her history of hypertension  -Discussed the utility of inferior turbinate reduction in the future if she does not respond to the medical management  -Discussed the contribution of bruxism to her possible ear pressure and measures for TMD  -Also gave her a NeilMed sample to try for saline irrigations    The patient indicates understanding of these issues and agrees to the plan.      Young Cardona MD  7/30/2024  8:05 AM

## 2024-08-23 NOTE — TELEPHONE ENCOUNTER
Please review; protocol failed/ has no protocol      Message sent for patient to have labs done.    Requested Prescriptions   Pending Prescriptions Disp Refills    AMLODIPINE 10 MG Oral Tab [Pharmacy Med Name: AMLODIPINE BESYLATE 10MG TABLETS] 90 tablet 0     Sig: TAKE 1 TABLET(10 MG) BY MOUTH AT BEDTIME       Hypertension Medications Protocol Failed - 8/22/2024  8:22 PM        Failed - CMP or BMP in past 12 months        Failed - Last BP reading less than 140/90     BP Readings from Last 1 Encounters:   05/09/24 (!) 130/96               Failed - EGFRCR or GFRAA > 50     GFR Evaluation            Passed - In person appointment or virtual visit in the past 12 mos or appointment in next 3 mos     Recent Outpatient Visits              3 weeks ago TMJ (temporomandibular joint disorder)    [facility]    Office Visit    3 months ago Hypertension with goal blood pressure less than 140/90    [facility] Geovanna Aguilar DO    Office Visit    5 months ago KEVIN (obstructive sleep apnea)    [facility]    Office Visit    6 months ago Snoring    [facility]    Office Visit    8 months ago Acute cough    [facility] Geovanna Aguilar DO    Office Visit                         Recent Outpatient Visits              3 weeks ago TMJ (temporomandibular joint disorder)    [facility], Young Segal MD    Office Visit    3 months ago Hypertension with goal blood pressure less than 140/90    [facility] Geovanna Aguilar DO    Office Visit    5 months ago KEVIN (obstructive sleep apnea)    [facility]    Office Visit    6 months ago Snoring    [facility]    Office  Visit    8 months ago Acute cough    [facility] Geovanna Aguilar,     Office Visit

## 2024-08-26 RX ORDER — AMLODIPINE BESYLATE 10 MG/1
10 TABLET ORAL NIGHTLY
Qty: 90 TABLET | Refills: 0 | Status: SHIPPED | OUTPATIENT
Start: 2024-08-26

## 2024-09-14 ENCOUNTER — LAB ENCOUNTER (OUTPATIENT)
Dept: LAB | Age: 44
End: 2024-09-14
Attending: FAMILY MEDICINE
Payer: COMMERCIAL

## 2024-09-14 DIAGNOSIS — Z00.00 ROUTINE GENERAL MEDICAL EXAMINATION AT A HEALTH CARE FACILITY: ICD-10-CM

## 2024-09-14 LAB
ALBUMIN SERPL-MCNC: 4.9 G/DL (ref 3.2–4.8)
ALBUMIN/GLOB SERPL: 1.5 {RATIO} (ref 1–2)
ALP LIVER SERPL-CCNC: 84 U/L
ALT SERPL-CCNC: 33 U/L
ANION GAP SERPL CALC-SCNC: 9 MMOL/L (ref 0–18)
AST SERPL-CCNC: 28 U/L (ref ?–34)
BASOPHILS # BLD AUTO: 0.03 X10(3) UL (ref 0–0.2)
BASOPHILS NFR BLD AUTO: 0.4 %
BILIRUB SERPL-MCNC: 0.6 MG/DL (ref 0.3–1.2)
BUN BLD-MCNC: 8 MG/DL (ref 9–23)
CALCIUM BLD-MCNC: 10.2 MG/DL (ref 8.7–10.4)
CHLORIDE SERPL-SCNC: 103 MMOL/L (ref 98–112)
CHOLEST SERPL-MCNC: 199 MG/DL (ref ?–200)
CO2 SERPL-SCNC: 25 MMOL/L (ref 21–32)
CREAT BLD-MCNC: 0.98 MG/DL
CREAT UR-SCNC: 167.6 MG/DL
EGFRCR SERPLBLD CKD-EPI 2021: 73 ML/MIN/1.73M2 (ref 60–?)
EOSINOPHIL # BLD AUTO: 0.13 X10(3) UL (ref 0–0.7)
EOSINOPHIL NFR BLD AUTO: 1.7 %
ERYTHROCYTE [DISTWIDTH] IN BLOOD BY AUTOMATED COUNT: 12.8 %
EST. AVERAGE GLUCOSE BLD GHB EST-MCNC: 120 MG/DL (ref 68–126)
FASTING PATIENT LIPID ANSWER: YES
FASTING STATUS PATIENT QL REPORTED: YES
GLOBULIN PLAS-MCNC: 3.3 G/DL (ref 2–3.5)
GLUCOSE BLD-MCNC: 83 MG/DL (ref 70–99)
HBA1C MFR BLD: 5.8 % (ref ?–5.7)
HCT VFR BLD AUTO: 39.5 %
HDLC SERPL-MCNC: 60 MG/DL (ref 40–59)
HGB BLD-MCNC: 12.9 G/DL
IMM GRANULOCYTES # BLD AUTO: 0.02 X10(3) UL (ref 0–1)
IMM GRANULOCYTES NFR BLD: 0.3 %
LDLC SERPL CALC-MCNC: 128 MG/DL (ref ?–100)
LYMPHOCYTES # BLD AUTO: 1.84 X10(3) UL (ref 1–4)
LYMPHOCYTES NFR BLD AUTO: 24.2 %
MCH RBC QN AUTO: 29.4 PG (ref 26–34)
MCHC RBC AUTO-ENTMCNC: 32.7 G/DL (ref 31–37)
MCV RBC AUTO: 90 FL
MICROALBUMIN UR-MCNC: 0.7 MG/DL
MICROALBUMIN/CREAT 24H UR-RTO: 4.2 UG/MG (ref ?–30)
MONOCYTES # BLD AUTO: 0.47 X10(3) UL (ref 0.1–1)
MONOCYTES NFR BLD AUTO: 6.2 %
NEUTROPHILS # BLD AUTO: 5.1 X10 (3) UL (ref 1.5–7.7)
NEUTROPHILS # BLD AUTO: 5.1 X10(3) UL (ref 1.5–7.7)
NEUTROPHILS NFR BLD AUTO: 67.2 %
NONHDLC SERPL-MCNC: 139 MG/DL (ref ?–130)
OSMOLALITY SERPL CALC.SUM OF ELEC: 281 MOSM/KG (ref 275–295)
PLATELET # BLD AUTO: 402 10(3)UL (ref 150–450)
POTASSIUM SERPL-SCNC: 5.1 MMOL/L (ref 3.5–5.1)
PROT SERPL-MCNC: 8.2 G/DL (ref 5.7–8.2)
RBC # BLD AUTO: 4.39 X10(6)UL
SODIUM SERPL-SCNC: 137 MMOL/L (ref 136–145)
TRIGL SERPL-MCNC: 58 MG/DL (ref 30–149)
VLDLC SERPL CALC-MCNC: 10 MG/DL (ref 0–30)
WBC # BLD AUTO: 7.6 X10(3) UL (ref 4–11)

## 2024-09-14 PROCEDURE — 82570 ASSAY OF URINE CREATININE: CPT

## 2024-09-14 PROCEDURE — 85025 COMPLETE CBC W/AUTO DIFF WBC: CPT

## 2024-09-14 PROCEDURE — 82043 UR ALBUMIN QUANTITATIVE: CPT

## 2024-09-14 PROCEDURE — 36415 COLL VENOUS BLD VENIPUNCTURE: CPT

## 2024-09-14 PROCEDURE — 80061 LIPID PANEL: CPT

## 2024-09-14 PROCEDURE — 83036 HEMOGLOBIN GLYCOSYLATED A1C: CPT

## 2024-09-14 PROCEDURE — 80053 COMPREHEN METABOLIC PANEL: CPT

## 2024-09-16 ENCOUNTER — PATIENT MESSAGE (OUTPATIENT)
Facility: CLINIC | Age: 44
End: 2024-09-16

## 2024-09-17 NOTE — TELEPHONE ENCOUNTER
From: Latosha London  To: Geovanna Aguilar  Sent: 9/16/2024 12:07 PM CDT  Subject: EKG     Hello I went this Saturday to have my blood work completed. However I was advised that the order for my EGK listed only perform at [facility]. Does the ekg have to be performed only at that location?

## 2024-09-27 ENCOUNTER — EKG ENCOUNTER (OUTPATIENT)
Dept: LAB | Facility: HOSPITAL | Age: 44
End: 2024-09-27
Attending: FAMILY MEDICINE
Payer: COMMERCIAL

## 2024-09-27 DIAGNOSIS — Z00.00 ROUTINE GENERAL MEDICAL EXAMINATION AT A HEALTH CARE FACILITY: ICD-10-CM

## 2024-09-27 PROCEDURE — 93005 ELECTROCARDIOGRAM TRACING: CPT

## 2024-09-27 PROCEDURE — 93010 ELECTROCARDIOGRAM REPORT: CPT | Performed by: INTERNAL MEDICINE

## 2024-09-29 LAB
ATRIAL RATE: 85 BPM
P AXIS: 67 DEGREES
P-R INTERVAL: 138 MS
Q-T INTERVAL: 356 MS
QRS DURATION: 72 MS
QTC CALCULATION (BEZET): 423 MS
R AXIS: 141 DEGREES
T AXIS: -24 DEGREES
VENTRICULAR RATE: 85 BPM

## 2024-10-03 ENCOUNTER — PATIENT MESSAGE (OUTPATIENT)
Facility: CLINIC | Age: 44
End: 2024-10-03

## 2024-10-03 RX ORDER — MONTELUKAST SODIUM 10 MG/1
10 TABLET ORAL NIGHTLY
Qty: 90 TABLET | Refills: 0 | Status: SHIPPED | OUTPATIENT
Start: 2024-10-03

## 2024-10-03 RX ORDER — AMLODIPINE BESYLATE 10 MG/1
10 TABLET ORAL NIGHTLY
Qty: 90 TABLET | Refills: 0 | Status: CANCELLED
Start: 2024-10-03

## 2024-10-06 RX ORDER — AMLODIPINE BESYLATE 10 MG/1
10 TABLET ORAL NIGHTLY
Qty: 90 TABLET | Refills: 0 | OUTPATIENT
Start: 2024-10-06

## 2024-10-06 RX ORDER — AMLODIPINE BESYLATE 10 MG/1
10 TABLET ORAL NIGHTLY
Qty: 90 TABLET | Refills: 3 | Status: SHIPPED | OUTPATIENT
Start: 2024-10-06 | End: 2024-10-07

## 2024-10-06 NOTE — TELEPHONE ENCOUNTER
Please review; protocol failed/ has no protocol      Please see patients MyChart Message   Latosha TESFAYE [id number] hours ago (8:20 PM)       Refills have been requested for the following medications:         amLODIPine 10 MG Oral Tab [Geovanna Aguilar]      Patient Comment: I only recived a partial refill. The prescription on 8/26 was for 90 days and pharmacy dispensed only 30 days. My insurance will only cover 90 day supply. Can you send new prescription for 90 supply to [facility]      Preferred pharmacy: [facility] #[id number] [address], [phone], [phone]    Requested Prescriptions   Pending Prescriptions Disp Refills    AMLODIPINE 10 MG Oral Tab [Pharmacy Med Name: AMLODIPINE BESYLATE 10MG TABLETS] 90 tablet 0     Sig: TAKE 1 TABLET(10 MG) BY MOUTH EVERY NIGHT       Hypertension Medications Protocol Failed - 10/3/2024  3:03 PM        Failed - Last BP reading less than 140/90     BP Readings from Last 1 Encounters:   05/09/24 (!) 130/96               Passed - CMP or BMP in past 12 months        Passed - In person appointment or virtual visit in the past 12 mos or appointment in next 3 mos     Recent Outpatient Visits              2 months ago TMJ (temporomandibular joint disorder)    [facility], Young Segal MD    Office Visit    5 months ago Hypertension with goal blood pressure less than 140/90    [facility] Geovanna Aguilar DO    Office Visit    6 months ago KEVIN (obstructive sleep apnea)    [facility]    Office Visit    8 months ago Snoring    [facility]    Office Visit    9 months ago Acute cough    [facility] Geovanna Aguilar DO    Office Visit          Future Appointments         Provider Department Appt Notes    In 1 month Kelley  Lana MELENDEZ MD [facility] - OB/GYN Annual Pap smear    In 1 month Geovanna Aguilar DO [facility] Review weight and blood pressure and get refills                    Passed - EGFRCR or GFRAA > 50     GFR Evaluation  EGFRCR: 73 , resulted on 9/14/2024             Recent Outpatient Visits              2 months ago TMJ (temporomandibular joint disorder)    [facility]    Office Visit    5 months ago Hypertension with goal blood pressure less than 140/90    [facility] Geovanna Aguilar DO    Office Visit    6 months ago KEVIN (obstructive sleep apnea)    [facility] [place]    Office Visit    8 months ago Snoring    [facility] [place]    Office Visit    9 months ago Acute cough    [facility] Geovanna Aguilar DO    Office Visit          Future Appointments         Provider Department Appt Notes    In 1 month Lana Benson MD [facility] - OB/GYN Annual Pap smear    In 1 month Geovanna Aguilar DO [facility] Review weight and blood pressure and get refills

## 2024-10-07 ENCOUNTER — PATIENT MESSAGE (OUTPATIENT)
Facility: CLINIC | Age: 44
End: 2024-10-07

## 2024-10-07 RX ORDER — AMLODIPINE BESYLATE 10 MG/1
10 TABLET ORAL NIGHTLY
Qty: 90 TABLET | Refills: 3 | Status: SHIPPED | OUTPATIENT
Start: 2024-10-07

## 2024-10-07 NOTE — TELEPHONE ENCOUNTER
Patient requesting pharmacy change to [facility]    Provider Information    Authorizing Provider Encounter Provider   Geovanna Aguilar DO Sage, Alison, DO     Medication Detail    Medication Quantity Refills Start End   amLODIPine 10 MG Oral Tab 90 tablet 3 10/6/2024 --   Sig:   Take 1 tablet (10 mg total) by mouth nightly.     Route:   Oral     Order #:   [id number]       Pharmacy    [facility] #[id number] [address] AT [facility] [address], [phone], [phone]

## 2024-10-07 NOTE — TELEPHONE ENCOUNTER
The patient is calling to have her medication sent to [facility] that's in her chart. [facility] is not a covered pharmacy for this medication.

## 2024-10-08 NOTE — TELEPHONE ENCOUNTER
From: Latosha London  To: Geovanna Aguilar  Sent: 10/7/2024 3:40 PM CDT  Subject: Mother looking for internist     Jimmy Aguilar,     My mother was looking for a family medicine provider. I know you are not accepting patients and wanted to know if maybe you can take her or if you can recommend a provider that’s as amazing as you.

## 2024-10-16 NOTE — TELEPHONE ENCOUNTER
Outgoing call to patient and inform that her mother can be seen with  .  Daughter will inform patient to call back .      No action needed at this time.

## 2024-10-21 RX ORDER — AZELASTINE 1 MG/ML
2 SPRAY, METERED NASAL 2 TIMES DAILY
Qty: 30 ML | Refills: 1 | Status: SHIPPED | OUTPATIENT
Start: 2024-10-21

## 2024-11-06 ENCOUNTER — OFFICE VISIT (OUTPATIENT)
Dept: OBGYN CLINIC | Facility: CLINIC | Age: 44
End: 2024-11-06
Payer: COMMERCIAL

## 2024-11-06 VITALS
DIASTOLIC BLOOD PRESSURE: 70 MMHG | SYSTOLIC BLOOD PRESSURE: 130 MMHG | WEIGHT: 254.88 LBS | BODY MASS INDEX: 38.63 KG/M2 | HEIGHT: 68 IN

## 2024-11-06 DIAGNOSIS — Z01.419 ENCOUNTER FOR GYNECOLOGICAL EXAMINATION WITHOUT ABNORMAL FINDING: Primary | ICD-10-CM

## 2024-11-06 DIAGNOSIS — Z80.3 FAMILY HISTORY OF BREAST CANCER: ICD-10-CM

## 2024-11-06 PROCEDURE — 99396 PREV VISIT EST AGE 40-64: CPT | Performed by: OBSTETRICS & GYNECOLOGY

## 2024-11-06 PROCEDURE — 90656 IIV3 VACC NO PRSV 0.5 ML IM: CPT | Performed by: OBSTETRICS & GYNECOLOGY

## 2024-11-06 PROCEDURE — 88175 CYTOPATH C/V AUTO FLUID REDO: CPT | Performed by: OBSTETRICS & GYNECOLOGY

## 2024-11-06 PROCEDURE — 87624 HPV HI-RISK TYP POOLED RSLT: CPT | Performed by: OBSTETRICS & GYNECOLOGY

## 2024-11-06 PROCEDURE — 90471 IMMUNIZATION ADMIN: CPT | Performed by: OBSTETRICS & GYNECOLOGY

## 2024-11-07 LAB — HPV E6+E7 MRNA CVX QL NAA+PROBE: NEGATIVE

## 2024-11-07 NOTE — PROGRESS NOTES
GYN H&P     2024  6:00 PM    CC: Patient is here for annual. Needs pap    HPI: Patient is a 44 year old  for above.     Menses: has Mirena IUD with light periods.       Patient's last menstrual period was 2024 (exact date).    OB History    Para Term  AB Living   1 1 1     1   SAB IAB Ectopic Multiple Live Births           1      # Outcome Date GA Lbr Kelton/2nd Weight Sex Type Anes PTL Lv   1 Term 05/10/02   7 lb 10 oz (3.459 kg) M Vag-Spont   ROGELIO       GYN hx:    Hx Prior Abnormal Pap: Yes (2019 [facility])  Pap Date: 24  Pap Result Notes: WNL  Follow Up Recommendation: Coloposcopy sone 18, last mammo 2024        Past Medical History:    Allergic rhinitis    Anxiety    BRCA negative    Colitis    Depression    most of her life, no suicidal ideation. No psychiatrist    Family history of breast cancer    in mom, Pat aunt and Mat great aunt, pt BRCA negative, but Tyrer-Kaiack = Darlene-Bob lifetime risk was 24%.    History of use of contraceptive intrauterine device (IUD)    Mirena IUD -2018    Human papilloma virus infection    Hypertension    Low grade squamous intraepith lesion on cytologic smear cervix (lgsil)    Migraine    reports weekly migraines    Obesity    Ovarian cyst     was evaluated for ovarian cyst    Pre-diabetes    PCP told her in 2017    Sleep apnea     Past Surgical History:   Procedure Laterality Date    Colposcopy, cervix w/upper adjacent vagina; w/biopsy(s), cervix  2018    Leep  2019    for SANCHEZ 1    Other surgical history      wisdom teeh impacted     Allergies[1]  Family History   Problem Relation Age of Onset    Schizophrenia Mother     Breast Cancer Mother 62    Ulcerative Colitis Mother     Cancer Mother     Psychiatric Mother     Hypertension Mother     Diabetes Father     High Blood Pressure Father     Breast Cancer Sister 47    Cancer Sister     Depression Brother     Anxiety Brother     Glaucoma Maternal Grandmother      Schizophrenia Maternal Grandfather     No Known Problems Paternal Grandmother     No Known Problems Paternal Grandfather     Depression Son     Anxiety Son     Breast Cancer Paternal Aunt 38    Diabetes Paternal Aunt     Breast Cancer Other 70    Colon Cancer Neg     Ovarian Cancer Neg      Social History     Socioeconomic History    Marital status: Single   Occupational History    Occupation: Customer service    Occupation: [employer]   Tobacco Use    Smoking status: Former     Current packs/day: 0.00     Types: Cigarettes     Quit date: 2009     Years since quittin.9    Smokeless tobacco: Never   Vaping Use    Vaping status: Some Days    Substances: THC   Substance and Sexual Activity    Alcohol use: Yes     Alcohol/week: 1.0 standard drink of alcohol     Types: 1 Glasses of wine per week     Comment: Social drinker - once a month    Drug use: Not Currently     Types: Cannabis     Comment: several times a week - vape, last time use month ago per patient    Sexual activity: Not Currently     Partners: Male     Birth control/protection: Mirena, I.U.D.     Social History     Social History Narrative    Live with mother and son     no H/O abuse        Feels safe in current situation.       Medications reviewed. See active list.     /70   Ht 68\"   Wt 254 lb 14.4 oz (115.6 kg)   LMP 2024 (Exact Date)   BMI 38.76 kg/m²       Exam:   GENERAL: well developed, well nourished, in no apparent distress  SKIN: no rashes, no suspicious lesions  HEENT: normal  NECK: supple; no thyroidmegaly, no adenopathy  BREASTS: symmetrical, nontender, no palpable masses or nodes, no nipple discharge, no skin changes, no dippling, no palpable axillary adenopathy  ABDOMEN: Soft, non distended; non tender, no masses.  Liver and spleen non-tender, no enlargement. No palpable hernias  GYNE/:  External Genitalia: Normal appearing, no lesions, normal hair distribution   Urethral meatus appear wnl, no abnormal  discharge or lesions noted.   Bladder: well supported, urethra wnl, no palpable tenderness or masses, no discharge  Vagina: normal pink mucosa, no lesions, normal clear discharge.   Uterus: midline, mobile, non-tender, firm and smooth  Cervix: pink, no lesions grossly visible, no discharge, iUD string visible.   Adnexa: non tender, no palpable masses, normal size  Anus:  No lesions or visible hemorrhoids        A/P: Patient is 44 year old female     1. Encounter for gynecological examination without abnormal finding    2. Family history of breast cancer  - MRI BREAST (W+WO) W/CAD BILAT (CPT=77049); Future      Lana Benson MD              [1]   Allergies  Allergen Reactions    Dust Mite Extract Coughing

## 2024-11-18 ENCOUNTER — PATIENT MESSAGE (OUTPATIENT)
Dept: OTOLARYNGOLOGY | Facility: CLINIC | Age: 44
End: 2024-11-18

## 2024-11-18 ENCOUNTER — TELEMEDICINE (OUTPATIENT)
Facility: CLINIC | Age: 44
End: 2024-11-18
Payer: COMMERCIAL

## 2024-11-18 DIAGNOSIS — R73.03 PREDIABETES: ICD-10-CM

## 2024-11-18 DIAGNOSIS — I10 HYPERTENSION WITH GOAL BLOOD PRESSURE LESS THAN 140/90: Primary | ICD-10-CM

## 2024-11-18 NOTE — PROGRESS NOTES
CC:    Chief Complaint   Patient presents with    Blood Pressure     Follow up.          HPI: 44 year old female presenting for video visit to follow-up on hypertension.  Started Amlodipine 10 mg nightly in May.   Has been checking her blood pressure at home, and it has been around 130/70 on average.  Also had a normal reading with Dr. Durand recently.  She is not having the headaches she was having, and she is able to do more as well.   She denies any adverse side effects, including no ankle swelling.  She has also been eating more vegetables and fewer carbohydrates. Has increased her intake of water as well.   She has been more active.    She feels she needs to lose weight as she breathes heavier at times.    She is trying to portion out everything.   Her mental health is much better overall.     ROS:  General:  No fever, no fatigue, weight gain   HEENT:  Denies congestion or nasal discharge, no vision changes   Cardio:  No chest pain  Pulmonary:  No cough, intermittent shortness of breath   Dermatologic:  No rashes  Neuro: no headaches, no dizziness     Past Medical History:    Allergic rhinitis    Anxiety    BRCA negative    Colitis    Depression    most of her life, no suicidal ideation. No psychiatrist    Family history of breast cancer    in mom, Pat aunt and Mat great aunt, pt BRCA negative, but Tyrer-Cruzick = Tyrer-Bob lifetime risk was 24%.    History of use of contraceptive intrauterine device (IUD)    Mirena IUD 2016-08/08/2018    Human papilloma virus infection    Hypertension    Low grade squamous intraepith lesion on cytologic smear cervix (lgsil)    Migraine    reports weekly migraines    Obesity    Ovarian cyst    2009 was evaluated for ovarian cyst    Pre-diabetes    PCP told her in 2017    Sleep apnea       Social History     Socioeconomic History    Marital status: Single     Spouse name: Not on file    Number of children: Not on file    Years of education: Not on file    Highest education  level: Not on file   Occupational History    Occupation: Customer service    Occupation: [employer]   Tobacco Use    Smoking status: Former     Current packs/day: 0.00     Types: Cigarettes     Quit date: 2009     Years since quittin.9    Smokeless tobacco: Never   Vaping Use    Vaping status: Some Days    Substances: THC   Substance and Sexual Activity    Alcohol use: Yes     Alcohol/week: 1.0 standard drink of alcohol     Types: 1 Glasses of wine per week     Comment: Social drinker - once a month    Drug use: Not Currently     Types: Cannabis     Comment: several times a week - vape, last time use month ago per patient    Sexual activity: Not Currently     Partners: Male     Birth control/protection: Mirena, I.U.D.   Other Topics Concern     Service Not Asked    Blood Transfusions No    Caffeine Concern No    Occupational Exposure Not Asked    Hobby Hazards Not Asked    Sleep Concern Not Asked    Stress Concern No    Weight Concern Yes    Special Diet No    Back Care Not Asked    Exercise No    Bike Helmet Not Asked    Seat Belt No    Self-Exams Not Asked   Social History Narrative    Live with mother and son     no H/O abuse        Feels safe in current situation.     Social Drivers of Health     Financial Resource Strain: Not on file   Food Insecurity: Not on file   Transportation Needs: Not on file   Physical Activity: Not on file   Stress: Not on file   Social Connections: Not on file   Housing Stability: Not on file       Current Outpatient Medications   Medication Sig Dispense Refill    buPROPion  MG Oral Tablet 24 Hr Take 1 tablet (150 mg total) by mouth daily. Take along with a 300 mg tablet daily for 450 mg total daily 90 tablet 0    clonazePAM 0.5 MG Oral Tab Take 1 tablet (0.5 mg total) by mouth daily as needed. 30 tablet 2    buPROPion  MG Oral Tablet 24 Hr Take 1 tablet (300 mg total) by mouth every morning. 90 tablet 0    FLUoxetine 20 MG Oral Cap Take 3  capsules (60 mg total) by mouth daily. 270 capsule 0    azelastine 0.1 % Nasal Solution 2 sprays by Nasal route 2 (two) times daily. 30 mL 1    amLODIPine 10 MG Oral Tab Take 1 tablet (10 mg total) by mouth nightly. 90 tablet 3    MONTELUKAST 10 MG Oral Tab TAKE 1 TABLET BY MOUTH NIGHTLY 90 tablet 0    clotrimazole-betamethasone 1-0.05 % External Cream Apply 1 Application topically 2 (two) times daily. 60 g 3    Multiple Vitamins-Minerals (BIOTIN PLUS/CALCIUM/VIT D3) Oral Tab Take by mouth.      fluticasone propionate 110 MCG/ACT Inhalation Aerosol Inhale 1 puff into the lungs 2 (two) times daily. 12 g 0    albuterol 108 (90 Base) MCG/ACT Inhalation Aero Soln Inhale 2 puffs into the lungs every 4 (four) hours as needed for Wheezing or Shortness of Breath. 18 g 3    Cholecalciferol 125 MCG (5000 UT) Oral Tab Take 1 tablet (5,000 Units total) by mouth daily.      Omega-3 Fatty Acids (FISH OIL OR) Take by mouth.      Multiple Vitamin (DAILY VALUE MULTIVITAMIN) Oral Tab Take by mouth.      levonorgestrel (MIRENA, 52 MG,) 20 MCG/24HR Intrauterine IUD 20 mcg (1 each total) by Intrauterine route once.         Dust mite extract        Physical:  General:  Alert, appropriate, no acute distress, A&O x 3  Pulmonary:  Speaking in clear and full sentences, no dyspnea   Psych: normal mood and affect     Assessment and Plan: 44-year-old female presenting for video visit to follow-up on hypertension.    1. Hypertension with goal blood pressure less than 140/90    -Blood pressure much better controlled on amlodipine 10 mg nightly, and tolerating medication well without any adverse side effects  -Continue with current medication regimen, and follow-up in 6 months    2. Prediabetes    - Continue healthy diet and regular exercise to improve hemoglobin A1c    Please note that the following visit was completed using two-way, real-time interactive audio and video communication. This has been done in good amarilis to provide continuity of  care in the best interest of the provider-patient relationship, due to the ongoing public health crisis/national emergency and because of restrictions of visitation. There are limitations of this visit as no physical exam could be performed. Every conscious effort was taken to allow for sufficient and adequate time. This billing was spent on reviewing labs, medications, radiology tests and decision making. Appropriate medical decision-making and tests are ordered as detailed in the plan of care above.      Geovanna Aguilar,   11/18/24  12:31 PM

## 2024-12-14 ENCOUNTER — TELEPHONE (OUTPATIENT)
Age: 44
End: 2024-12-14

## 2024-12-14 NOTE — TELEPHONE ENCOUNTER
Hello,  Sorry I missed you - I am reaching out from the [facility] Navigation department, following up on an order from your provider's office to assist in connecting you with resources for care. If you would like to discuss this further, please give us a call back at [phone], or for more immediate assistance you can contact our 24-hour help line at [phone]. We look forward to hearing from you soon.

## 2024-12-23 ENCOUNTER — TELEPHONE (OUTPATIENT)
Age: 44
End: 2024-12-23

## 2024-12-23 NOTE — TELEPHONE ENCOUNTER
Hello,     [facility] team has attempted to reach you regarding an order from Dr. Aguilar's office. We are reaching out in order to assist you in coordinating care and resources that may meet your needs. Please give our office a call at [phone]. For more immediate assistance you can contact our 24-hour help line at [phone]. We look forward to hearing from you soon.

## 2025-01-20 RX ORDER — MONTELUKAST SODIUM 10 MG/1
10 TABLET ORAL NIGHTLY
Qty: 90 TABLET | Refills: 0 | Status: SHIPPED | OUTPATIENT
Start: 2025-01-20

## 2025-02-25 ENCOUNTER — OFFICE VISIT (OUTPATIENT)
Dept: FAMILY MEDICINE CLINIC | Facility: CLINIC | Age: 45
End: 2025-02-25
Payer: COMMERCIAL

## 2025-02-25 VITALS
RESPIRATION RATE: 18 BRPM | OXYGEN SATURATION: 98 % | WEIGHT: 255 LBS | DIASTOLIC BLOOD PRESSURE: 80 MMHG | BODY MASS INDEX: 39 KG/M2 | SYSTOLIC BLOOD PRESSURE: 124 MMHG | TEMPERATURE: 99 F | HEART RATE: 87 BPM

## 2025-02-25 DIAGNOSIS — J11.1 INFLUENZA-LIKE ILLNESS: Primary | ICD-10-CM

## 2025-02-25 LAB
CONTROL LINE PRESENT WITH A CLEAR BACKGROUND (YES/NO): YES YES/NO
STREP GRP A CUL-SCR: NEGATIVE

## 2025-02-25 PROCEDURE — 87637 SARSCOV2&INF A&B&RSV AMP PRB: CPT | Performed by: NURSE PRACTITIONER

## 2025-02-25 PROCEDURE — 87880 STREP A ASSAY W/OPTIC: CPT | Performed by: NURSE PRACTITIONER

## 2025-02-25 PROCEDURE — 99213 OFFICE O/P EST LOW 20 MIN: CPT | Performed by: NURSE PRACTITIONER

## 2025-02-25 RX ORDER — BENZONATATE 200 MG/1
200 CAPSULE ORAL 3 TIMES DAILY PRN
Qty: 30 CAPSULE | Refills: 0 | Status: SHIPPED | OUTPATIENT
Start: 2025-02-25 | End: 2025-03-07

## 2025-02-25 NOTE — PROGRESS NOTES
HPI:   Latosha London is a 44 year old female who presents with ill symptoms for  3  days. Patient reports started with headache, not relieved with pain relievers, sore throat, neck pain, mild body aches, right sided hip/back pain, mild cough with wheezing upon retiring. Denies true fever, vomiting and diarrhea. Has tried sinus medication with some temporary relief and fever/pain reducer with not much relief.     Current Outpatient Medications   Medication Sig Dispense Refill    FLUoxetine HCl 40 MG Oral Cap Take 2 capsules (80 mg total) by mouth daily. 180 capsule 0    clonazePAM 0.5 MG Oral Tab Take 1 tablet (0.5 mg total) by mouth daily as needed. 30 tablet 2    buPROPion  MG Oral Tablet 24 Hr Take 1 tablet (300 mg total) by mouth every morning. 90 tablet 0    buPROPion  MG Oral Tablet 24 Hr Take 1 tablet (150 mg total) by mouth daily. Take along with a 300 mg tablet daily for 450 mg total daily 90 tablet 0    traZODone 50 MG Oral Tab Take 1 tablet (50 mg total) by mouth nightly as needed for Sleep. 90 tablet 0    montelukast 10 MG Oral Tab Take 1 tablet (10 mg total) by mouth nightly. 90 tablet 0    azelastine 0.1 % Nasal Solution 2 sprays by Nasal route 2 (two) times daily. 30 mL 1    amLODIPine 10 MG Oral Tab Take 1 tablet (10 mg total) by mouth nightly. 90 tablet 3    clotrimazole-betamethasone 1-0.05 % External Cream Apply 1 Application topically 2 (two) times daily. 60 g 3    Multiple Vitamins-Minerals (BIOTIN PLUS/CALCIUM/VIT D3) Oral Tab Take by mouth.      fluticasone propionate 110 MCG/ACT Inhalation Aerosol Inhale 1 puff into the lungs 2 (two) times daily. 12 g 0    albuterol 108 (90 Base) MCG/ACT Inhalation Aero Soln Inhale 2 puffs into the lungs every 4 (four) hours as needed for Wheezing or Shortness of Breath. 18 g 3    Cholecalciferol 125 MCG (5000 UT) Oral Tab Take 1 tablet (5,000 Units total) by mouth daily.      Omega-3 Fatty Acids (FISH OIL OR) Take by mouth.      Multiple Vitamin  (DAILY VALUE MULTIVITAMIN) Oral Tab Take by mouth.      levonorgestrel (MIRENA, 52 MG,) 20 MCG/24HR Intrauterine IUD 20 mcg (1 each total) by Intrauterine route once.       No current facility-administered medications for this visit.      Past Medical History:    Allergic rhinitis    Anxiety    BRCA negative    Colitis    Depression    most of her life, no suicidal ideation. No psychiatrist    Family history of breast cancer    in mom, Pat aunt and Mat great aunt, pt BRCA negative, but Tyrer-Hoda = Darlene-Bob lifetime risk was 24%.    History of use of contraceptive intrauterine device (IUD)    Mirena IUD 2016-08/08/2018    Human papilloma virus infection    Hypertension    Low grade squamous intraepith lesion on cytologic smear cervix (lgsil)    Migraine    reports weekly migraines    Obesity    Ovarian cyst    2009 was evaluated for ovarian cyst    Pre-diabetes    PCP told her in 2017    Sleep apnea      Past Surgical History:   Procedure Laterality Date    Colposcopy, cervix w/upper adjacent vagina; w/biopsy(s), cervix  01/11/2018    Leep  09/05/2019    for SANCHEZ 1    Other surgical history      wisdom teeh impacted      Family History   Problem Relation Age of Onset    Schizophrenia Mother     Breast Cancer Mother 62    Ulcerative Colitis Mother     Cancer Mother     Psychiatric Mother     Hypertension Mother     Diabetes Father     High Blood Pressure Father     Breast Cancer Sister 47    Cancer Sister     Depression Brother     Anxiety Brother     Glaucoma Maternal Grandmother     Schizophrenia Maternal Grandfather     No Known Problems Paternal Grandmother     No Known Problems Paternal Grandfather     Depression Son     Anxiety Son     Breast Cancer Paternal Aunt 38    Diabetes Paternal Aunt     Breast Cancer Other 70    Colon Cancer Neg     Ovarian Cancer Neg       Social History     Socioeconomic History    Marital status: Single   Occupational History    Occupation: Customer service    Occupation:  [employer]   Tobacco Use    Smoking status: Former     Current packs/day: 0.00     Types: Cigarettes     Quit date: 12/12/2009     Years since quitting: 15.2    Smokeless tobacco: Never   Vaping Use    Vaping status: Some Days    Substances: THC   Substance and Sexual Activity    Alcohol use: Yes     Alcohol/week: 1.0 standard drink of alcohol     Types: 1 Glasses of wine per week     Comment: Social drinker - once a month    Drug use: Not Currently     Types: Cannabis     Comment: several times a week - vape, last time use month ago per patient    Sexual activity: Not Currently     Partners: Male     Birth control/protection: Mirena, I.U.D.   Other Topics Concern    Blood Transfusions No    Caffeine Concern No    Stress Concern No    Weight Concern Yes    Special Diet No    Exercise No    Seat Belt No   Social History Narrative    Live with mother and son     no H/O abuse        Feels safe in current situation.         REVIEW OF SYSTEMS:   GENERAL: feels well otherwise  HEENT: congested, as above in HPI  LUNGS: denies shortness of breath with exertion, cough with wheezing when retiring  CARDIOVASCULAR: denies chest pain on exertion  GI: no nausea or abdominal pain, appetite down  NEURO: admits to headaches    EXAM:   /80   Pulse 87   Temp 99 °F (37.2 °C) (Oral)   Resp 18   Wt 255 lb (115.7 kg)   LMP 02/04/2025 (Approximate)   SpO2 98%   BMI 38.77 kg/m²   GENERAL: well developed, well nourished,in no apparent distress  HEENT: atraumatic, normocephalic,ears full and clear, nares with mild erythema and rhinorrhea, throat pink and clear. [place]la midline.  No sinus tenderness with palpation.  NECK: supple,posterior cervical adenopathy  LUNGS: clear to auscultation all lobes, breathing is non labored  CARDIO: RRR without murmur  Viral quad sent to lab.  Results for orders placed or performed in visit on 02/25/25   Strep A Assay W/Optic    Collection Time: 02/25/25 10:11 AM   Result Value Ref Range     Strep Grp A Screen Negative Negative    Control Line Present with a clear background (yes/no) Yes Yes/No    Kit Lot # [id number] Numeric    Kit Expiration Date 12/20/25 Date         ASSESSMENT AND PLAN:    PLAN:Latosha was seen today for sore throat.    Diagnoses and all orders for this visit:    Influenza-like illness  -     Strep A Assay W/Optic  -     Cancel: SARS-CoV-2/Flu A and B/RSV by PCR ([facility]); Future  -     SARS-CoV-2/Flu A and B/RSV by PCR ([facility])  -     benzonatate 200 MG Oral Cap; Take 1 capsule (200 mg total) by mouth 3 (three) times daily as needed for cough.      Negative strep, viral panel sent to lab. Note for return to work with improvement  provided. Self care discussed. Medication use and risk/benefit discussed. Patient is advised to follow up with PCP if not improving with treatment plan or seek immediate care if symptoms worsen.  The patient indicates understanding of these issues and agrees to the plan.  Patient Instructions   Self care for viral illnesses:  Benzonatate perles every eight hours with large glass of water for cough as needed. This medication may make you drowsy; numbs the throat and helps turn off \"tickle\" causing cough, can help with wheezing at bedtime.  Use warm moist heat to lymph node with gentle massage to help reduce swelling/pain. Use 2-3 times daily, or in hot shower.  Saline nasal spray such as Ocean or Simply Saline to nostrils 2-3 times daily to soothe tissues and keep mucus thin.  Salt water gargles (1 tsp. Salt in 6 oz lukewarm water), use several times daily to remove post nasal drainage and soothe throat.  Hydrate! (cold or hot based on comfort). Drink lots of water or other non dehydrating liquids to help with illness.   Use humidifier in bedroom for congestion. Hot steamy showers can loosen congestion and help cough. Gordy's vapor rub to chest can quiet cough. Keep water by your bed side to sip on if you awaken with cough/sore throat.  Hand washing-use hand   or wash hands frequently, cover your cough or sneeze, do not share towels or drinks with others.  May use Ibuprofen 400 mg every 6 hours as needed for pain/comfort.  No work or school until fever free for 24 hours and respiratory symptoms are mostly improved.  Follow up with primary care in two weeks if symptoms not completely resolved post walk in visit, or seek immediate care if symptoms significantly worsen.

## 2025-02-25 NOTE — PATIENT INSTRUCTIONS
Self care for viral illnesses:  Benzonatate perles every eight hours with large glass of water for cough as needed. This medication may make you drowsy; numbs the throat and helps turn off \"tickle\" causing cough, can help with wheezing at bedtime.  Use warm moist heat to lymph node with gentle massage to help reduce swelling/pain. Use 2-3 times daily, or in hot shower.  Saline nasal spray such as Ocean or Simply Saline to nostrils 2-3 times daily to soothe tissues and keep mucus thin.  Salt water gargles (1 tsp. Salt in 6 oz lukewarm water), use several times daily to remove post nasal drainage and soothe throat.  Hydrate! (cold or hot based on comfort). Drink lots of water or other non dehydrating liquids to help with illness.   Use humidifier in bedroom for congestion. Hot steamy showers can loosen congestion and help cough. Gordy's vapor rub to chest can quiet cough. Keep water by your bed side to sip on if you awaken with cough/sore throat.  Hand washing-use hand  or wash hands frequently, cover your cough or sneeze, do not share towels or drinks with others.  May use Ibuprofen 400 mg every 6 hours as needed for pain/comfort.  No work or school until fever free for 24 hours and respiratory symptoms are mostly improved.  Follow up with primary care in two weeks if symptoms not completely resolved post walk in visit, or seek immediate care if symptoms significantly worsen.

## 2025-02-26 LAB
FLUAV + FLUBV RNA SPEC NAA+PROBE: NOT DETECTED
FLUAV + FLUBV RNA SPEC NAA+PROBE: NOT DETECTED
RSV RNA SPEC NAA+PROBE: NOT DETECTED
SARS-COV-2 RNA RESP QL NAA+PROBE: NOT DETECTED

## 2025-04-21 ENCOUNTER — OFFICE VISIT (OUTPATIENT)
Dept: FAMILY MEDICINE CLINIC | Facility: CLINIC | Age: 45
End: 2025-04-21
Payer: COMMERCIAL

## 2025-04-21 VITALS
OXYGEN SATURATION: 96 % | HEIGHT: 68 IN | HEART RATE: 79 BPM | RESPIRATION RATE: 16 BRPM | BODY MASS INDEX: 38.04 KG/M2 | WEIGHT: 251 LBS | TEMPERATURE: 98 F | DIASTOLIC BLOOD PRESSURE: 66 MMHG | SYSTOLIC BLOOD PRESSURE: 108 MMHG

## 2025-04-21 DIAGNOSIS — R39.9 URINARY SYMPTOM OR SIGN: Primary | ICD-10-CM

## 2025-04-21 DIAGNOSIS — N89.8 VAGINAL DISCHARGE: ICD-10-CM

## 2025-04-21 LAB
APPEARANCE: CLEAR
BILIRUBIN: NEGATIVE
GLUCOSE (URINE DIPSTICK): NEGATIVE MG/DL
KETONES (URINE DIPSTICK): NEGATIVE MG/DL
LEUKOCYTES: NEGATIVE
MULTISTIX LOT#: ABNORMAL NUMERIC
NITRITE, URINE: NEGATIVE
PH, URINE: 5.5 (ref 4.5–8)
PROTEIN (URINE DIPSTICK): NEGATIVE MG/DL
SPECIFIC GRAVITY: 1.01 (ref 1–1.03)
URINE-COLOR: YELLOW
UROBILINOGEN,SEMI-QN: 0.2 MG/DL (ref 0–1.9)

## 2025-04-21 PROCEDURE — 87591 N.GONORRHOEAE DNA AMP PROB: CPT | Performed by: FAMILY MEDICINE

## 2025-04-21 PROCEDURE — 87086 URINE CULTURE/COLONY COUNT: CPT | Performed by: FAMILY MEDICINE

## 2025-04-21 PROCEDURE — 87491 CHLMYD TRACH DNA AMP PROBE: CPT | Performed by: FAMILY MEDICINE

## 2025-04-21 PROCEDURE — 81514 NFCT DS BV&VAGINITIS DNA ALG: CPT | Performed by: FAMILY MEDICINE

## 2025-04-22 LAB
BV BACTERIA DNA VAG QL NAA+PROBE: NEGATIVE
C GLABRATA DNA VAG QL NAA+PROBE: NEGATIVE
C KRUSEI DNA VAG QL NAA+PROBE: NEGATIVE
C TRACH DNA SPEC QL NAA+PROBE: NEGATIVE
CANDIDA DNA VAG QL NAA+PROBE: NEGATIVE
N GONORRHOEA DNA SPEC QL NAA+PROBE: NEGATIVE
T VAGINALIS DNA VAG QL NAA+PROBE: NEGATIVE

## 2025-04-22 RX ORDER — AZELASTINE HYDROCHLORIDE 137 UG/1
2 SPRAY, METERED NASAL 2 TIMES DAILY
Qty: 30 ML | Refills: 1 | Status: SHIPPED | OUTPATIENT
Start: 2025-04-22

## 2025-04-22 NOTE — PATIENT INSTRUCTIONS
Your testing will be back in the next 36-48 hours.   Monitor symptoms and contact the office if symptoms worsen.   Please plan to repeat your urinalysis in the next 2-4 weeks to recheck findings of blood in the urine.

## 2025-04-22 NOTE — PROGRESS NOTES
Chief Complaint   Patient presents with    Gynecologic Exam     7 days, Pelvic pain feels like cramping, Discharge, New sexual partner, denies itchy  OTC none       HPI:   Latosha London is a 44 year old female who presents for vaginal symptoms for 7 days.  Pt denies itching or external pain.   Reports vaginal d/c that is white and has no odor.  No urinary freqency or urgency.  No burning with urination. Did note some heaviness/pressure in the bladder last week, but that has resolved.  Notes lower abdominal/pelvic pain off and on.    Pt is sexually active, reports 1 partners in last 6 months.  No dyspareunia.  She reports condom use with this partner.    Pt also has Mirena IUD in place.       Current Medications[1]   Past Medical History[2]   Past Surgical History[3]   Family History[4]   Social History:   Short Social Hx on File[5]     REVIEW OF SYSTEMS:   GENERAL: no fatigue, fever, chills.  SKIN: denies any unusual skin lesions  EYES:denies blurred vision or double vision  HEENT: denies nasal congestion, sinus pain or ST  LUNGS: denies shortness of breath with exertion  CARDIOVASCULAR: denies chest pain on exertion  GI: denies abdominal pain,denies heartburn  : as above  MUSCULOSKELETAL: denies back pain    EXAM:   /66   Pulse 79   Temp 97.7 °F (36.5 °C)   Resp 16   Ht 5' 8\" (1.727 m)   Wt 251 lb (113.9 kg)   LMP 03/31/2025 (Approximate)   SpO2 96%   Breastfeeding No   BMI 38.16 kg/m²   Body mass index is 38.16 kg/m².   GENERAL: well developed, well nourished,in no apparent distress, pleasant pt.  SKIN: no rashes,no suspicious lesions  LUNGS: clear to auscultation, no W/R/R  CARDIO: RRR without murmur  GI: BS normoactive x 4, soft, no masses, no HSM. There is mild tenderness of the right lower quadrant. No rebound tenderness. There is also some tenderness in the left lower quadrant with direct palpation. No referred pain.   :introitus is normal,scant discharge present that is white, mucoid with  light pink tinge,cervix is pink and without lesion,IUD strings in place. no adnexal masses or tenderness.  Cultures obtained.     ASSESSMENT AND PLAN:   Latosha London is a 44 year old female who presents with   Encounter Diagnoses   Name Primary?    Urinary symptom or sign Yes    Vaginal discharge        Orders Placed This Encounter   Procedures    URINALYSIS, AUTO, W/O SCOPE    Urine Culture, Routine    Vaginitis Vaginosis PCR Panel    Chlamydia/Gc Amplification       Meds & Refills for this Visit:  Requested Prescriptions      No prescriptions requested or ordered in this encounter       Imaging & Consults:  None      GC/CT, vaginitis panel done.   Urine culture sent.   No medications given at this time.      Patient Instructions   Your testing will be back in the next 36-48 hours.   Monitor symptoms and contact the office if symptoms worsen.   Please plan to repeat your urinalysis in the next 2-4 weeks to recheck findings of blood in the urine.       Follow up if symptoms are no better in 2-3 days, sooner if symptoms worsen.           [1]   Current Outpatient Medications   Medication Sig Dispense Refill    traZODone 50 MG Oral Tab Take 1 tablet (50 mg total) by mouth nightly as needed for Sleep. 90 tablet 0    FLUoxetine HCl 40 MG Oral Cap Take 2 capsules (80 mg total) by mouth daily. 180 capsule 0    buPROPion  MG Oral Tablet 24 Hr Take 1 tablet (300 mg total) by mouth every morning. 90 tablet 0    buPROPion  MG Oral Tablet 24 Hr Take 1 tablet (150 mg total) by mouth daily. Take along with a 300 mg tablet daily for 450 mg total daily 90 tablet 0    clonazePAM 0.5 MG Oral Tab Take 1 tablet (0.5 mg total) by mouth daily as needed. 30 tablet 2    montelukast 10 MG Oral Tab Take 1 tablet (10 mg total) by mouth nightly. 90 tablet 0    azelastine 0.1 % Nasal Solution 2 sprays by Nasal route 2 (two) times daily. 30 mL 1    amLODIPine 10 MG Oral Tab Take 1 tablet (10 mg total) by mouth nightly. 90 tablet 3     clotrimazole-betamethasone 1-0.05 % External Cream Apply 1 Application topically 2 (two) times daily. 60 g 3    Multiple Vitamins-Minerals (BIOTIN PLUS/CALCIUM/VIT D3) Oral Tab Take by mouth.      fluticasone propionate 110 MCG/ACT Inhalation Aerosol Inhale 1 puff into the lungs 2 (two) times daily. 12 g 0    albuterol 108 (90 Base) MCG/ACT Inhalation Aero Soln Inhale 2 puffs into the lungs every 4 (four) hours as needed for Wheezing or Shortness of Breath. 18 g 3    Cholecalciferol 125 MCG (5000 UT) Oral Tab Take 1 tablet (5,000 Units total) by mouth daily.      Omega-3 Fatty Acids (FISH OIL OR) Take by mouth.      Multiple Vitamin (DAILY VALUE MULTIVITAMIN) Oral Tab Take by mouth.      levonorgestrel (MIRENA, 52 MG,) 20 MCG/24HR Intrauterine IUD 20 mcg (1 each total) by Intrauterine route once.     [2]   Past Medical History:   Allergic rhinitis    Anxiety    BRCA negative    Colitis    Depression    most of her life, no suicidal ideation. No psychiatrist    Family history of breast cancer    in mom, Pat aunt and Mat great aunt, pt BRCA negative, but Tyrer-Kaiack = Tyrefrain-Bob lifetime risk was 24%.    History of use of contraceptive intrauterine device (IUD)    Mirena IUD 2016-08/08/2018    Human papilloma virus infection    Hypertension    Low grade squamous intraepith lesion on cytologic smear cervix (lgsil)    Migraine    reports weekly migraines    Obesity    Ovarian cyst    2009 was evaluated for ovarian cyst    Pre-diabetes    PCP told her in 2017    Sleep apnea   [3]   Past Surgical History:  Procedure Laterality Date    Colposcopy, cervix w/upper adjacent vagina; w/biopsy(s), cervix  01/11/2018    Leep  09/05/2019    for SANCHEZ 1    Other surgical history      wisdom teeh impacted   [4]   Family History  Problem Relation Age of Onset    Schizophrenia Mother     Breast Cancer Mother 62    Ulcerative Colitis Mother     Cancer Mother     Psychiatric Mother     Hypertension Mother     Diabetes Father      High Blood Pressure Father     Breast Cancer Sister 47    Cancer Sister     Depression Brother     Anxiety Brother     Glaucoma Maternal Grandmother     Schizophrenia Maternal Grandfather     No Known Problems Paternal Grandmother     No Known Problems Paternal Grandfather     Depression Son     Anxiety Son     Breast Cancer Paternal Aunt 38    Diabetes Paternal Aunt     Breast Cancer Other 70    Colon Cancer Neg     Ovarian Cancer Neg    [5]   Social History  Socioeconomic History    Marital status: Single   Occupational History    Occupation: Customer service    Occupation: [employer]   Tobacco Use    Smoking status: Former     Current packs/day: 0.00     Types: Cigarettes     Quit date: 12/12/2009     Years since quitting: 15.3    Smokeless tobacco: Never   Vaping Use    Vaping status: Some Days    Substances: THC   Substance and Sexual Activity    Alcohol use: Yes     Alcohol/week: 1.0 standard drink of alcohol     Types: 1 Glasses of wine per week     Comment: Social drinker - once a month    Drug use: Not Currently     Types: Cannabis     Comment: several times a week - vape, last time use month ago per patient    Sexual activity: Not Currently     Partners: Male     Birth control/protection: Mirena, I.U.D.   Other Topics Concern    Blood Transfusions No    Caffeine Concern No    Stress Concern No    Weight Concern Yes    Special Diet No    Exercise No    Seat Belt No   Social History Narrative    Live with mother and son     no H/O abuse        Feels safe in current situation.

## 2025-04-26 ENCOUNTER — HOSPITAL ENCOUNTER (OUTPATIENT)
Dept: MRI IMAGING | Facility: HOSPITAL | Age: 45
Discharge: HOME | End: 2025-04-26
Attending: OBSTETRICS & GYNECOLOGY
Payer: COMMERCIAL

## 2025-04-26 DIAGNOSIS — Z80.3 FAMILY HISTORY OF BREAST CANCER: ICD-10-CM

## 2025-04-26 PROCEDURE — A9575 INJ GADOTERATE MEGLUMI 0.1ML: HCPCS | Performed by: OBSTETRICS & GYNECOLOGY

## 2025-04-26 PROCEDURE — 77049 MRI BREAST C-+ W/CAD BI: CPT | Performed by: OBSTETRICS & GYNECOLOGY

## 2025-04-26 RX ORDER — GADOTERATE MEGLUMINE 376.9 MG/ML
20 INJECTION INTRAVENOUS
Status: COMPLETED | OUTPATIENT
Start: 2025-04-26 | End: 2025-04-26

## 2025-04-26 RX ADMIN — GADOTERATE MEGLUMINE 20 ML: 376.9 INJECTION INTRAVENOUS at 14:20:00

## 2025-05-03 ENCOUNTER — PATIENT MESSAGE (OUTPATIENT)
Facility: CLINIC | Age: 45
End: 2025-05-03

## 2025-05-15 RX ORDER — MONTELUKAST SODIUM 10 MG/1
10 TABLET ORAL NIGHTLY
Qty: 90 TABLET | Refills: 0 | Status: SHIPPED | OUTPATIENT
Start: 2025-05-15

## 2025-05-20 ENCOUNTER — OFFICE VISIT (OUTPATIENT)
Dept: INTERNAL MEDICINE CLINIC | Facility: CLINIC | Age: 45
End: 2025-05-20
Payer: COMMERCIAL

## 2025-05-20 VITALS
OXYGEN SATURATION: 97 % | DIASTOLIC BLOOD PRESSURE: 74 MMHG | HEIGHT: 67 IN | SYSTOLIC BLOOD PRESSURE: 124 MMHG | RESPIRATION RATE: 18 BRPM | WEIGHT: 242 LBS | HEART RATE: 80 BPM | BODY MASS INDEX: 37.98 KG/M2

## 2025-05-20 DIAGNOSIS — G43.009 MIGRAINE WITHOUT AURA AND WITHOUT STATUS MIGRAINOSUS, NOT INTRACTABLE: ICD-10-CM

## 2025-05-20 DIAGNOSIS — I10 HYPERTENSION WITH GOAL BLOOD PRESSURE LESS THAN 140/90: ICD-10-CM

## 2025-05-20 DIAGNOSIS — F41.1 GENERALIZED ANXIETY DISORDER: ICD-10-CM

## 2025-05-20 DIAGNOSIS — R73.03 PREDIABETES: ICD-10-CM

## 2025-05-20 DIAGNOSIS — Z51.81 ENCOUNTER FOR THERAPEUTIC DRUG MONITORING: Primary | ICD-10-CM

## 2025-05-20 DIAGNOSIS — G47.33 OSA (OBSTRUCTIVE SLEEP APNEA): ICD-10-CM

## 2025-05-20 PROCEDURE — 99214 OFFICE O/P EST MOD 30 MIN: CPT | Performed by: NURSE PRACTITIONER

## 2025-05-20 RX ORDER — BENZONATATE 200 MG/1
200 CAPSULE ORAL 3 TIMES DAILY PRN
COMMUNITY
Start: 2025-02-25 | End: 2025-05-20

## 2025-05-20 NOTE — PROGRESS NOTES
[address], [address] [place] [id number]  Dept: [phone]       Date of Consult:  2025    Patient:  Latosha London  :      1980  MRN:      [id number]    Referring Provider: employee    Chief Complaint:    Chief Complaint   Patient presents with    Weight Problem     Pt was referred by a friend to our C, pt is interested in discussing wt lose medications.     SUBJECTIVE     History of Present Illness:  Latosha London is a new patient and has been referred to me for weight loss and weight management recommendations and evaluation and treatment.       Patient is considering medications; no to bariatric surgery for weight loss.  Patient denies any history of eating disorder(s).    Patient is employed at [employer].  Patient lives with mother and son - mom cooks.  On a scale of 1 to 10, patient's desire to lose weight is a 10.  On a scale of 1 to 10, patient's belief that she can lose weight is a 10.    Patient's goal weight:  - 6 months: 30lbs  - 1 year: 50lbs  Biggest weight loss in the past: 30 lb  How weight loss was achieved: 10-12 years - diet and exercise - trying to get back in the swing of things  Heaviest weight ever: 250lbs  Previous use of medical weight loss medications: none  Barriers to weight loss: Waking up at night eating,meal prepping, and staying consistent - life gets overwhelming for herself     Physical activity: Type: walking at work Frequency: 2 days/week  Stress level: 2/10 - Mediation, writing, and listening to music.   Sleep: 7 hours/night    Sleep screening: + snore, tired during the day, uses CPAP    Past Medical History: Past Medical History[1]    OBJECTIVE     Vitals: /74   Pulse 80   Resp 18   Ht 5' 7\" (1.702 m)   Wt 242 lb (109.8 kg)   LMP 2025 (Approximate)   SpO2 97%   BMI 37.90 kg/m²      Wt Readings from Last 6 Encounters:   25 242 lb (109.8 kg)   25  251 lb (113.9 kg)   02/25/25 255 lb (115.7 kg)   11/06/24 254 lb 14.4 oz (115.6 kg)   05/09/24 248 lb (112.5 kg)   12/13/23 245 lb (111.1 kg)      Patient Medications:  Current Medications[2]  Allergies:  Dust mite extract   Comorbidities:  hypertension and obstructive sleep apnea  Social History:  Reviewed  Surgical History:  Past Surgical History[3]  Family History:  Family History[4]    Typical Dietary Intake:  Breakfast AM Snack Lunch PM Snack Dinner   Instant Oatmeal 1 cup and banana   Shrimp and broccoli with fried rice, and a eggroll     2 baked chicken wings and white rice        After dinner behavior: -  Night eating: +  Portion sizes: could be better  Binge: -  Emotional: stress eating   Depression: - sees therapist and psychiatrist   Grazing: -  Cravings:  Yes  Sweet tooth: +  Crunchy/salty: -    Drinks: coffee with cream, water  Etoh: 1-2/month   Juice:  Yes - V8 splash     Number of restaurant or fast food meals/week:  3-4 meals/week - but disciplined, 1x wk    Nutritional Goals Reviewed and Discussed:     Calorie-controlled diet: 1642, Limit carbohydrates to 123 gms per day, Eat 100-200 calories within 1 hour of waking up, and Eat 3-4 cups of fresh fruit or vegetables daily. Protein: 144g    Behavior Modifications Reviewed and Discussed:    Eat breakfast, Eat 3 meals per day, Plan meals in advance, Read nutrition labels, Drink 64oz of water per day, Maintain a daily food journal, No drinking 30 minutes before or after meals, Utilize portion control strategies to reduce calorie intake, Identify triggers for eating and manage cues, and Eat slowly and take 20 to 30 minutes to complete each meal    BODY SCAN results:   Current body weight: 242.1lbs  Total body fat: 43.9%  Visceral fat: 13  Muscle Mass: 31.9lbs  Total body water: 42.5%     ROS:  Review of Systems   Constitutional:  Positive for fatigue.   Gastrointestinal:  Positive for constipation.   Musculoskeletal:  Positive for arthralgias and neck  pain.   Psychiatric/Behavioral:  Positive for sleep disturbance.         Does see sleep physician and has to take sleep medication    All other systems reviewed and are negative.     Physical Exam:  Physical Exam  Vitals reviewed.   Constitutional:       Appearance: Normal appearance.   HENT:      Head: Normocephalic and atraumatic.   Cardiovascular:      Pulses: Normal pulses.   Neurological:      General: No focal deficit present.      Mental Status: She is alert and oriented to person, place, and time. Mental status is at baseline.   Psychiatric:         Mood and Affect: Mood normal.         Behavior: Behavior normal.        ASSESSMENT   Encounter Diagnosis(ses):   1. Encounter for therapeutic drug monitoring    2. Hypertension with goal blood pressure less than 140/90    3. Prediabetes    4. KEVIN (obstructive sleep apnea)    5. Generalized anxiety disorder    6. Migraine without aura and without status migrainosus, not intractable      PLAN   Diagnoses and all orders for this visit:    Encounter for therapeutic drug monitoring  -     semaglutide-weight management 0.25 MG/0.5ML Subcutaneous Solution Auto-injector; Inject 0.5 mL (0.25 mg total) into the skin once a week for 4 doses.    Hypertension with goal blood pressure less than 140/90  -     semaglutide-weight management 0.25 MG/0.5ML Subcutaneous Solution Auto-injector; Inject 0.5 mL (0.25 mg total) into the skin once a week for 4 doses.    Prediabetes  Comments:  hemoglobin a1c 5.8% - 9/2024  Orders:  -     semaglutide-weight management 0.25 MG/0.5ML Subcutaneous Solution Auto-injector; Inject 0.5 mL (0.25 mg total) into the skin once a week for 4 doses.    KEVIN (obstructive sleep apnea)  Comments:  AHI score - 37.6 - sleep study scanned in 3/19/2024  Orders:  -     semaglutide-weight management 0.25 MG/0.5ML Subcutaneous Solution Auto-injector; Inject 0.5 mL (0.25 mg total) into the skin once a week for 4 doses.    Generalized anxiety disorder  -      semaglutide-weight management 0.25 MG/0.5ML Subcutaneous Solution Auto-injector; Inject 0.5 mL (0.25 mg total) into the skin once a week for 4 doses.    Migraine without aura and without status migrainosus, not intractable  -     semaglutide-weight management 0.25 MG/0.5ML Subcutaneous Solution Auto-injector; Inject 0.5 mL (0.25 mg total) into the skin once a week for 4 doses.      Reviewed labs.  Last A1c value was 5.8% done 2024.  Cholesterol: 199, done on 2024.  HDL Cholesterol: 60, done on 2024.  TriGlycerides 58, done on 2024.  LDL Cholesterol: 128, done on 2024.   Lab Results   Component Value Date    TSH 0.675 2023      Plan for b12, fasting insulin level, and vitamin d.   [id number]  Normal sinus rhythm  Right axis deviation  Nonspecific T wave abnormality  Abnormal ECG     OBESITY/WEIGHT GAIN PLAN:  Medication Management:  Start medication(s): Wegovy 0.25mg weekly subcutaneous injections d/t clinical indication of obesity with BMI of 37.90 and co-morbidities of HTN and KEVIN.    Consider medication(s): Metformin, Phentermine in the future as needed.     Discussed:  - Recommended intensive lifestyle and behavioral modifications for weight loss.    - Discussed importance of building muscle, maintaining muscle mass, and increasing PO protein   - Educated patient on lifestyle modifications: Mediterranean/Whole Food/Plant Strong/Low Glycemic Index diet, moderate alcohol consumption, reduced sodium intake to no more than 2,400 mg/day, and at least 150-300 minutes of moderate physical activity per week. Stress can create barrier to weight loss and exercise is a great way to reduce stress.  - Discussed the importance of whole food, plant based, minimally to non-processed diet rich in fruits, vegetables, whole grains and healthy fats, and meats/seafood without hormones, steroids, or antibiotics. Avoid processed, poor quality carbohydrates, refined grains, flour, sugar. Increase protein  intake and don't skip meals.  Goals for next month:  1. Keep a food log, aim for 100 grams carbs/day.   2. Drink 64 ounces of non-caloric beverages per day. No fruit juices or regular soda.  3. Aim for 150 minutes moderate exercise per week.    4. Increase fruit and vegetable servings to 5-6 per day.    5. Improve sleep and stress, both VERY important in weight loss and management.    Discussed medication options for weight loss in detail with patient, including oral and injectable agents.   Discussed therapy options and referrals available, pt declined at this time.  Discussed dietician consultation referral and appointments for one-on-one meal prepping/planning, pt declined at this time.  Discussed fitness consultation/memberships referrals with pt, pt declined at this time.    Denies personal or family hx medullary thyroid CA, endocrine neoplasia syndrome, pancreatitis hx, suicidal ideation. No renal impairment, severe GI disease, diabetes, pancreatitis risks noted.     I spent 45 minutes of face to face time with patient, 30 minutes of which were spent on nutrition, exercise, sleep, stress, weight loss/behavioral counseling and care coordination.    Follow-up: 4 week telehealth, 3 month in-person, 6 month in-person    Alesha Johnston, DNP, FNP-BC          [1]   Past Medical History:   Anxiety    BRCA negative    Colitis    Depression    most of her life, no suicidal ideation. No psychiatrist    Family history of breast cancer    in mom, Pat aunt and Mat great aunt, pt BRCA negative, but Tyrefrain-Kaiack = Javier lifetime risk was 24%.    History of use of contraceptive intrauterine device (IUD)    Mirena IUD 2016-08/08/2018    Human papilloma virus infection    Hypertension    Low grade squamous intraepith lesion on cytologic smear cervix (lgsil)    Migraine    reports weekly migraines    Obesity    Ovarian cyst    2009 was evaluated for ovarian cyst    Pre-diabetes    PCP told her in 2017    Sleep apnea    [2]   Current Outpatient Medications   Medication Sig Dispense Refill    semaglutide-weight management 0.25 MG/0.5ML Subcutaneous Solution Auto-injector Inject 0.5 mL (0.25 mg total) into the skin once a week for 4 doses. 2 mL 0    MONTELUKAST 10 MG Oral Tab TAKE 1 TABLET BY MOUTH EVERY DAY AT NIGHT 90 tablet 0    azelastine 137 MCG/SPRAY Nasal Solution SPRAY 2 SPRAYS BY NASAL ROUTE TWICE A DAY 30 mL 1    FLUoxetine HCl 40 MG Oral Cap Take 2 capsules (80 mg total) by mouth daily. 180 capsule 0    buPROPion  MG Oral Tablet 24 Hr Take 1 tablet (300 mg total) by mouth every morning. 90 tablet 0    buPROPion  MG Oral Tablet 24 Hr Take 1 tablet (150 mg total) by mouth daily. Take along with a 300 mg tablet daily for 450 mg total daily 90 tablet 0    clonazePAM 0.5 MG Oral Tab Take 1 tablet (0.5 mg total) by mouth daily as needed. 30 tablet 2    amLODIPine 10 MG Oral Tab Take 1 tablet (10 mg total) by mouth nightly. 90 tablet 3    clotrimazole-betamethasone 1-0.05 % External Cream Apply 1 Application topically 2 (two) times daily. 60 g 3    Multiple Vitamins-Minerals (BIOTIN PLUS/CALCIUM/VIT D3) Oral Tab Take by mouth.      fluticasone propionate 110 MCG/ACT Inhalation Aerosol Inhale 1 puff into the lungs 2 (two) times daily. 12 g 0    albuterol 108 (90 Base) MCG/ACT Inhalation Aero Soln Inhale 2 puffs into the lungs every 4 (four) hours as needed for Wheezing or Shortness of Breath. 18 g 3    Cholecalciferol 125 MCG (5000 UT) Oral Tab Take 1 tablet (5,000 Units total) by mouth daily.      Omega-3 Fatty Acids (FISH OIL OR) Take by mouth.      Multiple Vitamin (DAILY VALUE MULTIVITAMIN) Oral Tab Take by mouth.      levonorgestrel (MIRENA, 52 MG,) 20 MCG/24HR Intrauterine IUD 20 mcg (1 each total) by Intrauterine route once.     [3]   Past Surgical History:  Procedure Laterality Date    Colposcopy, cervix w/upper adjacent vagina; w/biopsy(s), cervix  01/11/2018    Leep  09/05/2019    for SANCHEZ 1    Other  surgical history      arianna meng impacted   [4]   Family History  Problem Relation Age of Onset    Schizophrenia Mother     Breast Cancer Mother 62    Ulcerative Colitis Mother     Cancer Mother     Psychiatric Mother     Hypertension Mother     Diabetes Father     High Blood Pressure Father     Breast Cancer Sister 47    Cancer Sister     Depression Brother     Anxiety Brother     Glaucoma Maternal Grandmother     Schizophrenia Maternal Grandfather     No Known Problems Paternal Grandmother     No Known Problems Paternal Grandfather     Depression Son     Anxiety Son     Breast Cancer Paternal Aunt 38    Diabetes Paternal Aunt     Breast Cancer Other 70    Colon Cancer Neg     Ovarian Cancer Neg

## 2025-05-21 NOTE — PATIENT INSTRUCTIONS
Welcome to the [facility]!!    I can't wait to keep working with you on this journey that is life. Always unpredictable and curveballs are no doubt to happen, but I hope you know I'm here to help and guide you along the way.  No judgment zone remember!  Ok...  Next steps:  1.   Call our office at [phone] to schedule future follow-up visits: 4 week telehealth, 3 month in-person, 6 month follow-up in person (this is policy to be seen in clinic every 6 months for medication refills).    2.  Fill your prescribed medication and take as discussed and prescribed: Start: Wegovy 0.25mg weekly subcutaneous injections d/t clinical indication of obesity with BMI of 37.90 and co-morbidities of HTN and KEVIN. Plan to maintain this dose until next visit, whether it is telehealth or in-person visit. Any further dose titrations will be considered at appointments only, unless otherwise discussed. Our clinical staff do submit prior authorizations for patients, but this may take one week plus to complete. Our office will be in touch during this process if needed. If cost/supply prohibitive plan: Metformin or Phentermine determine best alternative.     Try to work on the following dietary changes this first month, tailored to your height and weight specifically, and no need to be perfect:  Daily protein recommendation to start: 123 grams  Daily carbohydrate: 144g  Daily calories: 1642    1.  Drink water with meals and throughout the day, cut down on soda and/or juice if consumed. Consider flavored water options like Bubbly, Spindrift, Hint and Abril. Or add fresh lemon, lime, cinnamon sticks, cucumber, orange to your water!  2.  Eat breakfast daily and focus on having protein with each meal, examples include: greek yogurt, cottage cheese, hard boiled egg, whole grain toast with peanut butter/almond butter/avocado.   3.  Reduce refined carbohydrates and sugars which includes items such as sweets, as  well as rice, pasta, and bread. Make sure to choose whole grain options when having them with just 1 serving per meal about the size of your inner palm. I always say, try to reduce how much you eat that comes from a bag. Yes, including chips and crackers and cookies.  4.  Consume non starchy veggies daily working towards making them a good 50% of your daily food intake. Add them to lunch and dinner consistently.  5.  Optional can start a daily probiotic: VSL#3, (order on line at www.vsl3.com). Take 1 capsule daily with water for 30 days, then reduce to 1 every other day (this will reduce the cost). Capsules can be left out for 2 weeks, but then must be refrigerated.      Please download lauro My Fitness Pal, LoseIt! Or MyNet Diary to monitor daily dietary intake and you will be able to see if you are eating the right amount of calories or too much or too little which would hinder weight loss. Additionally this will help to see your daily carbohydrate and protein intake. When you set the lauro up choose 1-2 lbs/week as a goal.  Keeping a paper food journal is an option as well to remain accountable for your choices- this is the start to mindful eating! A low calorie diet has been consistently shown to support weight loss.     Continue or start exercising to help establish a routine. If not already exercising begin with 1 day and progress as able with long-term goal of 30 minutes 5 days a week at a minimum. I like to say 20 min of moving your body 3 times a week and build from there. Find something you like! Tripwireube has free workouts and range in time frame!      Meditation and exercise daily can help manage and control stress and lead to better sleep. Chronic stress and interrupted sleep can make weight loss very difficult.  Exercising is one way to help with stress, but meditation using the CALM Lauro or another comparable alternative can be done in your home or place of work with little time commitment. This Lauro can also  help work on behavior change and improve sleep. Check out the segment under Calm Masterclass and listen to The 4 Pillars of Health. A great way to begin learning about the foundation of lifestyle with practical tips to use in your every day.     Check out www.yourweightmatters.org blog for continued daily support and education along this weight loss journey!    Patient Resources:     Personal Training/Fitness Classes/Health Coaching     [facility] @ [email or web address] Full fitness center with group fitness and personal training. Discount available as client of [facility].  Health Coaching and Personal Training with Britney Lema at our [facility]- individual weekly coaching with option to add personal training and small group fitness classes targeted at weight loss- [phone] and/or email @ [email or web address]  360FIT [place] [email or web address] Group Fitness [phone] and/or email Saba at [email or web address]  [facility] Fitness Centers with multiple locations: MyUnfold ([email or web address]), Intrinsity The [employer] Fitness (www.[email or web address]), Clothia (www.Purple Binder), The Exercise  (www.exercisecoach.[email or web address])     Online Fitness  Fitness  on Adallomube  Sit and Be Fit - Chair exercise series Www.sitandbefit.org     Apps for on the Go Fitness  SafariDesk 7 Minute Workout (orange box with white 7) - free on the go HIIT training lauor  Peldaryn Lauro @ www.onepeloton.com - I LOVE this lauro - and annual membership is like $200 you don't even need a TIM Groupn equipment, but they have 5min, 10min, 15min, etc up to 60min workouts and it feels like you are one-on-one with a  - they have multiple trainers and levels of their program! they have bodyweight sessions, variations in times of classes, great motivator to have someone doing their workout with  you!     Nutrition Trackers and Tools  LoseIT! And My Fitness Pal apps and MyNetDiary  NOOM - virtual health coaching  FitFoundation (healthy meals on the go) in [place] @ [email or web address]  Yolanda FRANCISCO @ [email or web address] and Pgvhmg58 (keto and low carb plans recommended) @ www.[email or web address].com, Metabolic Meals @ www.MyMetabolicMeals.com - individual prepared meals to go  Gobble, Blue Apron, Home , Every Plate, Sunbasket- on line meal delivery programs for preparation at home  Meal Village in [place] for homemade meals to go @ www.[email or web address]  Diet Doctor @ www.dietdoctor.com - low carb swaps  YummAskBot - meal prep and planning chelsey (www.yummly.com)     Stress Management/Behavior/Mindful Eating  CALM meditation chelsey (www.calm.com)  Headspace  Am I Hungry? Mindful eating virtual  chelsey  Www.yourweightmatters.org - Obesity Action Coalition sponsored Blog posts daily  Motivation chelsey (black box with white \")- daily supportive messages sent to your phone  Aura - pretty cheap, like $70 for the year subscription - as short as 4min meditation or as long as you want, has nighttime calming music for sleep too!     Books/Video Education/Podcasts  Mindless Eating by Paresh Wan  Why We Get Sick by Pako Fontaine (a book about insulin resistance)  Atomic Habits by Mitch Gil (a book about taking small steps to promote greater behavior change)   Can't Hurt Me by Jimi Mejia (a book exploring the power of discipline in achieving your goals)  Your Body in Balance: The New Science of Food, Hormones, and Health by Dr. Juan Stoner  The Menopause Diet Plan by Rachel Price and Cyndi Corona  The Complete Guide to fasting by Dr. Garcia  Sugar, Salt & Fat by Jennifer Mishra, Ph.D, R.D.  Weight Loss Surgery Will Not Treat Food Addiction by Celina Robbins Ph.D  The Game Changers- Netflix Documentary on plant based nutrition  Fed Up - documentary about obesity (Free on Utube)  The Truth About Sugar - documentary on sugar (Free  on Utube, https://youtu.be/6V4jxlmBD9x)  The Dr. Riddle T5 Wellness Plan by Dr. Davion Riddle MD  Fitlosophy Fitspiration - journal to better health (found at [employer] in fitness aisle)  What Happened to You?- a look at the impact trauma has on behavior written by Mitchel Girard and Dr. Yazan Tarango  Whole Again by Bruce Macdonald - discovering your true self after trauma  Earnest Rojas talk on [employer], The Call to Courage  Podcasts: The Exam Room by the Physician's Committee, Nutrition Facts by Dr. Kristine Canada and Landmines by Eric Rojas - A diabetes guide  Why Zebras don't get ulcers - book on the effects of chronic stress!    We are here to support you with weight loss, but please remember that you still need your primary care provider for your routine health maintenance.

## 2025-06-12 ENCOUNTER — OFFICE VISIT (OUTPATIENT)
Facility: CLINIC | Age: 45
End: 2025-06-12
Payer: COMMERCIAL

## 2025-06-12 VITALS
HEIGHT: 68 IN | SYSTOLIC BLOOD PRESSURE: 130 MMHG | WEIGHT: 246 LBS | DIASTOLIC BLOOD PRESSURE: 84 MMHG | HEART RATE: 90 BPM | OXYGEN SATURATION: 97 % | BODY MASS INDEX: 37.28 KG/M2

## 2025-06-12 DIAGNOSIS — R31.29 MICROSCOPIC HEMATURIA: Primary | ICD-10-CM

## 2025-06-12 LAB
APPEARANCE: CLEAR
BILIRUBIN: NEGATIVE
GLUCOSE (URINE DIPSTICK): NEGATIVE MG/DL
KETONES (URINE DIPSTICK): NEGATIVE MG/DL
MULTISTIX LOT#: ABNORMAL NUMERIC
NITRITE, URINE: NEGATIVE
PH, URINE: 5.5 (ref 4.5–8)
PROTEIN (URINE DIPSTICK): NEGATIVE MG/DL
SPECIFIC GRAVITY: 1.01 (ref 1–1.03)
UROBILINOGEN,SEMI-QN: 0.2 MG/DL (ref 0–1.9)

## 2025-06-12 PROCEDURE — 81002 URINALYSIS NONAUTO W/O SCOPE: CPT | Performed by: FAMILY MEDICINE

## 2025-06-12 PROCEDURE — 99213 OFFICE O/P EST LOW 20 MIN: CPT | Performed by: FAMILY MEDICINE

## 2025-06-12 RX ORDER — SEMAGLUTIDE 0.25 MG/.5ML
0.25 INJECTION, SOLUTION SUBCUTANEOUS
COMMUNITY
Start: 2025-05-22

## 2025-06-12 NOTE — PROGRESS NOTES
CC:    Chief Complaint   Patient presents with    Follow - Up     Microscopic hematuria       HPI: 44 year old female here to follow-up on microscopic hematuria.   Was having abdominal pain and cramping back in April so went to the walk-in clinic for this.  Had a negative urine culture, but did have moderate blood in the urine despite not having her period.   The abdominal pain and cramping have resolved, and has not had any visible blood in the urine.   Three days ago she had some aching in her lower back as well, and when she goes to bed her lower back always aches.     ROS:  General:  No fever, no fatigue, no weight changes  GI:  No N/V/D, intermittent constipation, no current abdominal pain   :  No discharge, no dysuria, no polyuria, no hematuria  Dermatologic:  No rashes    Past Medical History[1]    Social History     Socioeconomic History    Marital status: Single     Spouse name: Not on file    Number of children: Not on file    Years of education: Not on file    Highest education level: Not on file   Occupational History    Occupation: Customer service    Occupation: [employer]   Tobacco Use    Smoking status: Former     Current packs/day: 0.00     Types: Cigarettes     Quit date: 12/12/2009     Years since quitting: 15.5    Smokeless tobacco: Never   Vaping Use    Vaping status: Some Days    Substances: THC   Substance and Sexual Activity    Alcohol use: Yes     Alcohol/week: 1.0 standard drink of alcohol     Types: 1 Glasses of wine per week     Comment: Social drinker - once a month    Drug use: Not Currently     Comment: several times a week - vape, last time use month ago per patient    Sexual activity: Not Currently     Partners: Male     Birth control/protection: Mirena, I.U.D.   Other Topics Concern     Service Not Asked    Blood Transfusions No    Caffeine Concern No    Occupational Exposure Not Asked    Hobby Hazards Not Asked    Sleep Concern Not Asked    Stress Concern No     Weight Concern Yes    Special Diet No    Back Care Not Asked    Exercise No    Bike Helmet Not Asked    Seat Belt No    Self-Exams Not Asked   Social History Narrative    Live with mother and son     no H/O abuse        Feels safe in current situation.     Social Drivers of Health     Food Insecurity: Not on file   Transportation Needs: Not on file   Stress: Not on file   Housing Stability: Not on file       Current Medications[2]    Dust mite extract      Vitals:   Vitals:    06/12/25 1819   BP: 130/84   Pulse: 90   SpO2: 97%   Weight: 246 lb (111.6 kg)   Height: 5' 8\" (1.727 m)       Body mass index is 37.4 kg/m².    Physical:  General:  Alert, appropriate, no acute distress   GI:  Soft, positive bowel sounds, lower abdominal and right flank tenderness, no masses, no guarding, no rebound  Dermatologic:  No rashes or lesions    Recent Results (from the past 24 hours)   URINALYSIS NONAUTO W/O SCOPE    Collection Time: 06/12/25  6:41 PM   Result Value Ref Range    Glucose Urine Negative Negative mg/dL    Bilirubin Urine Negative Negative    Ketones, UA Negative Negative - Trace mg/dL    Spec Gravity 1.015 1.005 - 1.030    Blood Urine Moderate (A) Negative    PH Urine 5.5 5.0 - 8.0    Protein Urine Negative Negative - Trace mg/dL    Urobilinogen Urine 0.2 0.2 - 1.0 mg/dL    Nitrite Urine Negative Negative    Leukocyte Esterase Urine Trace (A) Negative    APPEARANCE clear Clear    Color Urine yellow' Yellow    Multistix Lot# [id number] Numeric    Multistix Expiration Date 10/31/25 Date         Assessment and Plan: 44-year-old female here to follow-up on microscopic hematuria.    1. Microscopic hematuria    - Repeat urinalysis shows moderate blood in the urine so we will check kidney and bladder ultrasound to rule out nephrolithiasis given intermittent flank and abdominal pain as well  - Instructed to drink a lot of water pending results, and reviewed warning signs and ED precautions  - URINALYSIS NONAUTO W/O SCOPE  - z  Insight  KIDNEY/BLADDER (CPT=76770); Future  - z Insight  KIDNEY/BLADDER (IIJ=75191)      Geovanna Aguilar DO  06/12/25  6:38 PM         [1]   Past Medical History:   Anxiety    BRCA negative    Colitis    Depression    most of her life, no suicidal ideation. No psychiatrist    Family history of breast cancer    in mom, Pat aunt and Mat great aunt, pt BRCA negative, but Tyrer-Cruzick = Tyrer-Cuzick lifetime risk was 24%.    History of use of contraceptive intrauterine device (IUD)    Mirena IUD 2016-08/08/2018    Human papilloma virus infection    Hypertension    Low grade squamous intraepith lesion on cytologic smear cervix (lgsil)    Migraine    reports weekly migraines    Obesity    Ovarian cyst    2009 was evaluated for ovarian cyst    Pre-diabetes    PCP told her in 2017    Sleep apnea   [2]   Current Outpatient Medications   Medication Sig Dispense Refill    WEGOVY 0.25 MG/0.5ML Subcutaneous Solution Auto-injector Inject 0.5 mL (0.25 mg total) into the skin.      clonazePAM 0.5 MG Oral Tab Take 1 tablet (0.5 mg total) by mouth daily as needed. 30 tablet 2    buPROPion  MG Oral Tablet 24 Hr Take 1 tablet (150 mg total) by mouth daily. Take along with a 300 mg tablet daily for 450 mg total daily 90 tablet 0    buPROPion  MG Oral Tablet 24 Hr Take 1 tablet (300 mg total) by mouth every morning. 90 tablet 0    FLUoxetine HCl 40 MG Oral Cap Take 2 capsules (80 mg total) by mouth daily. 180 capsule 0    MONTELUKAST 10 MG Oral Tab TAKE 1 TABLET BY MOUTH EVERY DAY AT NIGHT 90 tablet 0    azelastine 137 MCG/SPRAY Nasal Solution SPRAY 2 SPRAYS BY NASAL ROUTE TWICE A DAY 30 mL 1    amLODIPine 10 MG Oral Tab Take 1 tablet (10 mg total) by mouth nightly. 90 tablet 3    clotrimazole-betamethasone 1-0.05 % External Cream Apply 1 Application topically 2 (two) times daily. 60 g 3    fluticasone propionate 110 MCG/ACT Inhalation Aerosol Inhale 1 puff into the lungs 2 (two) times daily. 12 g 0    albuterol 108 (90  Base) MCG/ACT Inhalation Aero Soln Inhale 2 puffs into the lungs every 4 (four) hours as needed for Wheezing or Shortness of Breath. 18 g 3    Cholecalciferol 125 MCG (5000 UT) Oral Tab Take 1 tablet (5,000 Units total) by mouth daily.      Omega-3 Fatty Acids (FISH OIL OR) Take by mouth.      Multiple Vitamin (DAILY VALUE MULTIVITAMIN) Oral Tab Take by mouth.      levonorgestrel (MIRENA, 52 MG,) 20 MCG/24HR Intrauterine IUD 20 mcg (1 each total) by Intrauterine route once.

## 2025-06-16 ENCOUNTER — LAB ENCOUNTER (OUTPATIENT)
Dept: LAB | Facility: HOSPITAL | Age: 45
End: 2025-06-16
Attending: FAMILY MEDICINE
Payer: COMMERCIAL

## 2025-06-16 ENCOUNTER — TELEPHONE (OUTPATIENT)
Facility: CLINIC | Age: 45
End: 2025-06-16

## 2025-06-16 DIAGNOSIS — R35.89 POLYURIA: Primary | ICD-10-CM

## 2025-06-16 DIAGNOSIS — R35.89 POLYURIA: ICD-10-CM

## 2025-06-16 LAB
BILIRUB UR QL STRIP.AUTO: NEGATIVE
CLARITY UR REFRACT.AUTO: CLEAR
COLOR UR AUTO: COLORLESS
GLUCOSE UR STRIP.AUTO-MCNC: NORMAL MG/DL
KETONES UR STRIP.AUTO-MCNC: NEGATIVE MG/DL
LEUKOCYTE ESTERASE UR QL STRIP.AUTO: NEGATIVE
NITRITE UR QL STRIP.AUTO: NEGATIVE
PH UR STRIP.AUTO: 5 [PH] (ref 5–8)
PROT UR STRIP.AUTO-MCNC: NEGATIVE MG/DL
SP GR UR STRIP.AUTO: 1.01 (ref 1–1.03)
UROBILINOGEN UR STRIP.AUTO-MCNC: NORMAL MG/DL

## 2025-06-16 PROCEDURE — 81001 URINALYSIS AUTO W/SCOPE: CPT

## 2025-06-16 NOTE — TELEPHONE ENCOUNTER
She did not have urine testing done, she had a kidney ultrasound which showed a possible kidney stone but no signs of infection.  Increase water intake is the best for this as long as she is not having significant pain.  If she is having increased urinary frequency I suggest she come in for a urinalysis and culture reflex.  I would wait on the urinalysis results before sending an antibiotic.

## 2025-06-16 NOTE — TELEPHONE ENCOUNTER
Attempted to contact patient.  Connection was bad.  Called back and haleigh straight to voicemail.  i-Human Patientst message sent.

## 2025-06-16 NOTE — TELEPHONE ENCOUNTER
Dr Aguilar, please advise    Patient (name and  verified) calling for urine test results. Patient c/o frequent urination and is concerned a urinary tract infection. Patient is asking if she needs an antibiotic?    Last office visit was 25

## 2025-06-20 ENCOUNTER — TELEMEDICINE (OUTPATIENT)
Dept: INTERNAL MEDICINE CLINIC | Facility: CLINIC | Age: 45
End: 2025-06-20
Payer: COMMERCIAL

## 2025-06-20 DIAGNOSIS — I10 HYPERTENSION WITH GOAL BLOOD PRESSURE LESS THAN 140/90: ICD-10-CM

## 2025-06-20 DIAGNOSIS — G43.009 MIGRAINE WITHOUT AURA AND WITHOUT STATUS MIGRAINOSUS, NOT INTRACTABLE: ICD-10-CM

## 2025-06-20 DIAGNOSIS — Z51.81 ENCOUNTER FOR THERAPEUTIC DRUG MONITORING: Primary | ICD-10-CM

## 2025-06-20 DIAGNOSIS — R73.03 PREDIABETES: ICD-10-CM

## 2025-06-20 DIAGNOSIS — G47.33 OSA (OBSTRUCTIVE SLEEP APNEA): ICD-10-CM

## 2025-06-20 DIAGNOSIS — F41.1 GENERALIZED ANXIETY DISORDER: ICD-10-CM

## 2025-06-20 PROCEDURE — 98006 SYNCH AUDIO-VIDEO EST MOD 30: CPT | Performed by: NURSE PRACTITIONER

## 2025-06-20 NOTE — PROGRESS NOTES
[facility] WEIGHT MANAGEMENT VIRTUAL ENCOUNTER     Latosha London verbally consents to a Virtual/Telephone Check-In service on 06/20/25   Patient understands and accepts financial responsibility for any deductible, co-insurance and/or co-pays associated with this service.    HISTORY OF PRESENT ILLNESS  Chief Complaint   Patient presents with    Weight Check     Wegovy 0.25mg weekly injections     Latosha London is a 44 year old female is being evaluated as a video visit using Telemedicine with live, interactive video and audio. Latosha is presenting on telehealth today for f/u on medical weight loss program for the treatment of overweight, obesity, or morbid obesity.     Weight gain/loss since LOV based on home monitoring:   Home scale: 246lbs  Has lost  +4# lbs since LOV 1 month ago     Compliance with medication: Wegovy 0.25mg  Tolerating well, helping with decreasing appetite and no side effects     Feels like the Wegovy 0.25mg is helping with the cravings  Exercise/Activity: gym membership - 2x/wk - 20min  Nutrition: eating regular meals, +protein, + veggies, + tracking reports   - small portions and eat 2-4hrs   Stress is manageable   Sleep: 5-8 hours/night, waking up feeling rested    Denies chest pain, shortness of breath, dizziness, blurred vision, headache, paresthesia, nausea/vomiting.     [facility] Follow Up    General Information  Success Moment: I had good amount protien and started working  Challenging Moment: Walking up at night snacking  Nutrition Recall  Breakfast: 1/4 Italian beef Lunch: 3/4 Italian beef   Dinner: Protein shake Snacks: Chocolate chip dunkers,   Fluids: 75 Dining Out: 1   Exercise   Patient stated exercises # days/week: 2  Patient stated perceived level of   exertion: 3 Anaerobic Days: 1   Aerobic Days: 1   Patient stated average level of stress: 1  Sleep   Patient stated # hours uninterrupted sleep: 6   Patient stated feels   restful: No      Cause of disruption of sleep: Using restroom    Goals: Stop snacking at night               Wt Readings from Last 6 Encounters:   06/12/25 246 lb (111.6 kg)   05/20/25 242 lb (109.8 kg)   04/21/25 251 lb (113.9 kg)   02/25/25 255 lb (115.7 kg)   11/06/24 254 lb 14.4 oz (115.6 kg)   05/09/24 248 lb (112.5 kg)     REVIEW OF SYSTEMS  Review of Systems   All other systems reviewed and are negative.     EXAM  Reviewed most recent set of vitals   Physical Exam:  Physical Exam  Vitals reviewed.   Constitutional:       Appearance: Normal appearance.   HENT:      Head: Normocephalic and atraumatic.   Pulmonary:      Effort: Pulmonary effort is normal.   Neurological:      General: No focal deficit present.      Mental Status: She is alert and oriented to person, place, and time. Mental status is at baseline.   Psychiatric:         Mood and Affect: Mood normal.         Behavior: Behavior normal.        Lab Results   Component Value Date    WBC 7.6 09/14/2024    RBC 4.39 09/14/2024    HGB 12.9 09/14/2024    HCT 39.5 09/14/2024    MCV 90.0 09/14/2024    MCH 29.4 09/14/2024    MCHC 32.7 09/14/2024    RDW 12.8 09/14/2024    .0 09/14/2024    MPV 7.3 (L) 12/12/2017     Lab Results   Component Value Date    GLU 83 09/14/2024    BUN 8 (L) 09/14/2024    BUNCREA 11.8 01/28/2021    CREATSERUM 0.98 09/14/2024    ANIONGAP 9 09/14/2024    GFRNAA 102 02/26/2022    GFRAA 117 02/26/2022    CA 10.2 09/14/2024    OSMOCALC 281 09/14/2024    ALKPHO 84 09/14/2024    AST 28 09/14/2024    ALT 33 09/14/2024    BILT 0.6 09/14/2024    TP 8.2 09/14/2024    ALB 4.9 (H) 09/14/2024    GLOBULIN 3.3 09/14/2024     09/14/2024    K 5.1 09/14/2024     09/14/2024    CO2 25.0 09/14/2024     Lab Results   Component Value Date     09/14/2024    A1C 5.8 (H) 09/14/2024     Lab Results   Component Value Date    CHOLEST 199 09/14/2024    TRIG 58 09/14/2024    HDL 60 (H) 09/14/2024     (H) 09/14/2024    VLDL 10 09/14/2024    NONHDLC 139 (H) 09/14/2024     Lab Results   Component  Value Date    TSH 0.675 05/23/2023     Lab Results   Component Value Date    B12 1,212 (H) 07/25/2018     Lab Results   Component Value Date    VITD 33.9 05/23/2023     Medications Ordered Prior to Encounter[1]    ASSESSMENT  Analyzed weight data:       Diagnoses and all orders for this visit:    Encounter for therapeutic drug monitoring  -     Discontinue: semaglutide-weight management 0.5 MG/0.5ML Subcutaneous Solution Auto-injector; Inject 0.5 mL (0.5 mg total) into the skin once a week for 4 doses.  -     semaglutide-weight management 0.5 MG/0.5ML Subcutaneous Solution Auto-injector; Inject 0.5 mL (0.5 mg total) into the skin once a week for 4 doses.    Hypertension with goal blood pressure less than 140/90  -     Discontinue: semaglutide-weight management 0.5 MG/0.5ML Subcutaneous Solution Auto-injector; Inject 0.5 mL (0.5 mg total) into the skin once a week for 4 doses.  -     semaglutide-weight management 0.5 MG/0.5ML Subcutaneous Solution Auto-injector; Inject 0.5 mL (0.5 mg total) into the skin once a week for 4 doses.    KEVIN (obstructive sleep apnea)  -     Discontinue: semaglutide-weight management 0.5 MG/0.5ML Subcutaneous Solution Auto-injector; Inject 0.5 mL (0.5 mg total) into the skin once a week for 4 doses.  -     semaglutide-weight management 0.5 MG/0.5ML Subcutaneous Solution Auto-injector; Inject 0.5 mL (0.5 mg total) into the skin once a week for 4 doses.    Migraine without aura and without status migrainosus, not intractable  -     Discontinue: semaglutide-weight management 0.5 MG/0.5ML Subcutaneous Solution Auto-injector; Inject 0.5 mL (0.5 mg total) into the skin once a week for 4 doses.  -     semaglutide-weight management 0.5 MG/0.5ML Subcutaneous Solution Auto-injector; Inject 0.5 mL (0.5 mg total) into the skin once a week for 4 doses.    Generalized anxiety disorder  -     Discontinue: semaglutide-weight management 0.5 MG/0.5ML Subcutaneous Solution Auto-injector; Inject 0.5 mL (0.5 mg  total) into the skin once a week for 4 doses.  -     semaglutide-weight management 0.5 MG/0.5ML Subcutaneous Solution Auto-injector; Inject 0.5 mL (0.5 mg total) into the skin once a week for 4 doses.    Prediabetes  -     Discontinue: semaglutide-weight management 0.5 MG/0.5ML Subcutaneous Solution Auto-injector; Inject 0.5 mL (0.5 mg total) into the skin once a week for 4 doses.  -     semaglutide-weight management 0.5 MG/0.5ML Subcutaneous Solution Auto-injector; Inject 0.5 mL (0.5 mg total) into the skin once a week for 4 doses.    PLAN  Initial visit: 5/20/2025 - heaviest weight: 242lbs - baseline BMI: 37.90  Continue with medications: Wegovy 0.5mg weekly injections  Advised of side effects and adverse effects of this medication  Contradictions:   Reviewed labs   Continue with vitamin d OTC   Prediabetes  Reviewed last a1c   Diet and lifestyle changes - low carb - on Wegovy  KEVIN  Stable  Sleep study 3/19/2024 - AHI score 37.6 continue with CPAP  Hypertension  Advised to monitor blood pressure and pulse at home/ given parameters to review and contact provider.  Stable - managed by PCP    Nutrition: low carb diet/ recommended to eat breakfast daily/ regular protein intake  Follow up with dietitian and psychologist as recommended.  Discussed the role of sleep and stress in weight management.  Counseled on comprehensive weight loss plan including attention to nutrition, exercise and behavior/stress management for success. See patient instruction below for more details.  Discussed strategies to overcome barriers to successful weight loss and weight maintenance  FITTE: ACSM recommendations (150-300 minutes/ week in active weight loss)     There are no Patient Instructions on file for this visit.    No follow-ups on file.    Patient verbalizes understanding.    Total time spent on chart review, pre-charting, obtaining history, counseling, and educating, reviewing labs was 20 minutes.       Pt understands phone/video  evaluation is not a substitute for face to face examination or emergency care. Pt advised to go to the ER or call 911 for worsening symptoms or acute distress.       Please note that the following visit was completed using two-way, real-time interactive audio and/or video communication.  This has been done in good amarilis to provide continuity of care in the best interest of the provider-patient relationship, due to the ongoing public health crisis/national emergency and because of restrictions of visitation.  There are limitations of this visit as no physical exam could be performed.  Every conscious effort was taken to allow for sufficient and adequate time.  This billing was spent on reviewing labs, medications, radiology tests and decision making.  Appropriate medical decision-making and tests are ordered as detailed in the plan of care above.     Alesha Johnston, ROSELYN, FNP-BC     Answers submitted by the patient for this visit:  Medical Weight Loss Follow Up (Submitted on 6/20/2025)  If greater than 5/1O how would you grade your coping mechanisms?: moderate         [1]   Current Outpatient Medications on File Prior to Visit   Medication Sig Dispense Refill    clonazePAM 0.5 MG Oral Tab Take 1 tablet (0.5 mg total) by mouth daily as needed. 30 tablet 2    buPROPion  MG Oral Tablet 24 Hr Take 1 tablet (150 mg total) by mouth daily. Take along with a 300 mg tablet daily for 450 mg total daily 90 tablet 0    buPROPion  MG Oral Tablet 24 Hr Take 1 tablet (300 mg total) by mouth every morning. 90 tablet 0    FLUoxetine HCl 40 MG Oral Cap Take 2 capsules (80 mg total) by mouth daily. 180 capsule 0    MONTELUKAST 10 MG Oral Tab TAKE 1 TABLET BY MOUTH EVERY DAY AT NIGHT 90 tablet 0    azelastine 137 MCG/SPRAY Nasal Solution SPRAY 2 SPRAYS BY NASAL ROUTE TWICE A DAY 30 mL 1    amLODIPine 10 MG Oral Tab Take 1 tablet (10 mg total) by mouth nightly. 90 tablet 3    clotrimazole-betamethasone 1-0.05 % External  Cream Apply 1 Application topically 2 (two) times daily. 60 g 3    fluticasone propionate 110 MCG/ACT Inhalation Aerosol Inhale 1 puff into the lungs 2 (two) times daily. 12 g 0    albuterol 108 (90 Base) MCG/ACT Inhalation Aero Soln Inhale 2 puffs into the lungs every 4 (four) hours as needed for Wheezing or Shortness of Breath. 18 g 3    Cholecalciferol 125 MCG (5000 UT) Oral Tab Take 1 tablet (5,000 Units total) by mouth daily.      Omega-3 Fatty Acids (FISH OIL OR) Take by mouth.      Multiple Vitamin (DAILY VALUE MULTIVITAMIN) Oral Tab Take by mouth.      levonorgestrel (MIRENA, 52 MG,) 20 MCG/24HR Intrauterine IUD 20 mcg (1 each total) by Intrauterine route once.       No current facility-administered medications on file prior to visit.

## 2025-06-24 ENCOUNTER — OFFICE VISIT (OUTPATIENT)
Dept: FAMILY MEDICINE CLINIC | Facility: CLINIC | Age: 45
End: 2025-06-24
Payer: COMMERCIAL

## 2025-06-24 VITALS
BODY MASS INDEX: 37 KG/M2 | HEART RATE: 90 BPM | SYSTOLIC BLOOD PRESSURE: 120 MMHG | WEIGHT: 245 LBS | RESPIRATION RATE: 20 BRPM | DIASTOLIC BLOOD PRESSURE: 80 MMHG | OXYGEN SATURATION: 98 % | TEMPERATURE: 98 F

## 2025-06-24 DIAGNOSIS — J02.9 SORE THROAT: Primary | ICD-10-CM

## 2025-06-24 LAB
CONTROL LINE PRESENT WITH A CLEAR BACKGROUND (YES/NO): YES YES/NO
KIT LOT #: NORMAL NUMERIC

## 2025-06-24 PROCEDURE — 99213 OFFICE O/P EST LOW 20 MIN: CPT | Performed by: NURSE PRACTITIONER

## 2025-06-24 PROCEDURE — 87880 STREP A ASSAY W/OPTIC: CPT | Performed by: NURSE PRACTITIONER

## 2025-06-25 NOTE — PROGRESS NOTES
CHIEF COMPLAINT:     Chief Complaint   Patient presents with    Sore Throat     X 2 weeks  Sx: sore throat, dry mouth, tonsil swelling        HPI:   Latosha London is a 44 year old female presents to clinic with complaint of sore throat. Patient has had for 2 weeks. Patient reports following associated symptoms: swollen lymph nodes. Denies nasal congestion, cough.    Denies fever, chills, rash, nausea, headache, ear pain.    Treating symptoms with supportive care, warm compresses on neck.  Vomited on the way here. No stomach pain.    Current Medications[1]   Past Medical History[2]   Social History:  Short Social Hx on File[3]     REVIEW OF SYSTEMS:   GENERAL HEALTH: feels well otherwise, good appetite  SKIN: denies any unusual skin lesions or rashes  HEENT: denies ear pain, See HPI  RESPIRATORY: denies shortness of breath or wheezing  CARDIOVASCULAR: denies chest pain or palpitations   GI: denies diarrhea  NEURO: denies dizziness or lightheadedness    EXAM:   /80   Pulse 90   Temp 98.1 °F (36.7 °C)   Resp 20   Wt 245 lb (111.1 kg)   LMP 05/13/2025 (Approximate)   SpO2 98%   BMI 37.25 kg/m²   GENERAL: well developed, well nourished,in no apparent distress  SKIN: no rashes,no suspicious lesions  HEAD: atraumatic, normocephalic  EYES: conjunctiva clear, EOM intact  EARS: TM's clear, non-injected, no bulging, retraction, or fluid bilaterally  NOSE: nostrils patent, no exudates, nasal mucosa pink and noninflamed  THROAT: oral mucosa pink, moist. Posterior pharynx mild erythematous and injected. Tonsils 2/4.   No trismus, hoarseness, muffled voice, stridor, or uvular deviation.    NECK: supple, non-tender  LUNGS: clear to auscultation bilaterally; no wheezes, rales, or rhonchi. Breathing is non labored.  CARDIO: RRR without murmur  EXTREMITIES: no cyanosis, clubbing or edema  LYMPH: bilateral anterior cervical lymphadenopathy. No  posterior cervical or occipital lymphadenopathy.    Recent Results (from the  past 24 hours)   Strep A Assay W/Optic    Collection Time: 06/24/25  7:33 PM   Result Value Ref Range    Strep Grp A Screen neg Negative    Control Line Present with a clear background (yes/no) yes Yes/No    Kit Lot # [id number] Numeric    Kit Expiration Date 05- Date         ASSESSMENT AND PLAN:   Assessment: 1.   Encounter Diagnosis   Name Primary?    Sore throat Yes     Rapid strep screen is negative   Ibuprofen/tylenol prn.     Supportive care.   Plan: Discussed that due to symptoms and negative rapid strep this is most likely viral and does not require antibiotics.   Comfort measures explained and discussed as listed in Patient Instructions  Follow up with PCP in 3-5 days if not improving, condition worsens, or fever greater than or equal to 100.4 persists for 72 hours.        The patient/parent indicates understanding of these issues and agrees to the plan.             [1]   Current Outpatient Medications   Medication Sig Dispense Refill    semaglutide-weight management 0.5 MG/0.5ML Subcutaneous Solution Auto-injector Inject 0.5 mL (0.5 mg total) into the skin once a week for 4 doses. 2 mL 0    clonazePAM 0.5 MG Oral Tab Take 1 tablet (0.5 mg total) by mouth daily as needed. 30 tablet 2    buPROPion  MG Oral Tablet 24 Hr Take 1 tablet (150 mg total) by mouth daily. Take along with a 300 mg tablet daily for 450 mg total daily 90 tablet 0    buPROPion  MG Oral Tablet 24 Hr Take 1 tablet (300 mg total) by mouth every morning. 90 tablet 0    FLUoxetine HCl 40 MG Oral Cap Take 2 capsules (80 mg total) by mouth daily. 180 capsule 0    MONTELUKAST 10 MG Oral Tab TAKE 1 TABLET BY MOUTH EVERY DAY AT NIGHT 90 tablet 0    azelastine 137 MCG/SPRAY Nasal Solution SPRAY 2 SPRAYS BY NASAL ROUTE TWICE A DAY 30 mL 1    amLODIPine 10 MG Oral Tab Take 1 tablet (10 mg total) by mouth nightly. 90 tablet 3    clotrimazole-betamethasone 1-0.05 % External Cream Apply 1 Application topically 2 (two) times daily. 60 g 3     fluticasone propionate 110 MCG/ACT Inhalation Aerosol Inhale 1 puff into the lungs 2 (two) times daily. 12 g 0    albuterol 108 (90 Base) MCG/ACT Inhalation Aero Soln Inhale 2 puffs into the lungs every 4 (four) hours as needed for Wheezing or Shortness of Breath. 18 g 3    Cholecalciferol 125 MCG (5000 UT) Oral Tab Take 1 tablet (5,000 Units total) by mouth daily.      Omega-3 Fatty Acids (FISH OIL OR) Take by mouth.      Multiple Vitamin (DAILY VALUE MULTIVITAMIN) Oral Tab Take by mouth.      levonorgestrel (MIRENA, 52 MG,) 20 MCG/24HR Intrauterine IUD 20 mcg (1 each total) by Intrauterine route once.     [2]   Past Medical History:   Anxiety    BRCA negative    Colitis    Depression    most of her life, no suicidal ideation. No psychiatrist    Family history of breast cancer    in mom, Pat aunt and Mat great aunt, pt BRCA negative, but Tyrer-Cruzick = Tyrer-Cuzick lifetime risk was 24%.    History of use of contraceptive intrauterine device (IUD)    Mirena IUD 2016-08/08/2018    Human papilloma virus infection    Hypertension    Low grade squamous intraepith lesion on cytologic smear cervix (lgsil)    Migraine    reports weekly migraines    Obesity    Ovarian cyst    2009 was evaluated for ovarian cyst    Pre-diabetes    PCP told her in 2017    Sleep apnea   [3]   Social History  Socioeconomic History    Marital status: Single   Occupational History    Occupation: Customer service    Occupation: [email or web address]   Tobacco Use    Smoking status: Former     Current packs/day: 0.00     Types: Cigarettes     Quit date: 12/12/2009     Years since quitting: 15.5    Smokeless tobacco: Never   Vaping Use    Vaping status: Some Days    Substances: THC   Substance and Sexual Activity    Alcohol use: Yes     Alcohol/week: 1.0 standard drink of alcohol     Types: 1 Glasses of wine per week     Comment: Social drinker - once a month    Drug use: Not Currently     Comment: several times a week - vape, last time use month  ago per patient    Sexual activity: Not Currently     Partners: Male     Birth control/protection: Mirena, I.U.D.   Other Topics Concern    Blood Transfusions No    Caffeine Concern No    Stress Concern No    Weight Concern Yes    Special Diet No    Exercise No    Seat Belt No   Social History Narrative    Live with mother and son     no H/O abuse        Feels safe in current situation.

## 2025-06-27 ENCOUNTER — PATIENT MESSAGE (OUTPATIENT)
Dept: OTOLARYNGOLOGY | Facility: CLINIC | Age: 45
End: 2025-06-27

## 2025-06-27 NOTE — TELEPHONE ENCOUNTER
Dr. Cardona, see Latosha's [email or web address] message, I did recommend that he come and see you next week with the new throat and ear symptoms. His last appt was in July 2024.

## 2025-07-07 DIAGNOSIS — Z51.81 ENCOUNTER FOR THERAPEUTIC DRUG MONITORING: ICD-10-CM

## 2025-07-07 DIAGNOSIS — G47.33 OSA (OBSTRUCTIVE SLEEP APNEA): ICD-10-CM

## 2025-07-07 DIAGNOSIS — I10 HYPERTENSION WITH GOAL BLOOD PRESSURE LESS THAN 140/90: ICD-10-CM

## 2025-07-07 DIAGNOSIS — F41.1 GENERALIZED ANXIETY DISORDER: ICD-10-CM

## 2025-07-07 DIAGNOSIS — R73.03 PREDIABETES: ICD-10-CM

## 2025-07-07 DIAGNOSIS — G43.009 MIGRAINE WITHOUT AURA AND WITHOUT STATUS MIGRAINOSUS, NOT INTRACTABLE: ICD-10-CM

## 2025-07-08 ENCOUNTER — TELEPHONE (OUTPATIENT)
Dept: OTOLARYNGOLOGY | Facility: CLINIC | Age: 45
End: 2025-07-08

## 2025-07-09 NOTE — TELEPHONE ENCOUNTER
Requesting   Requested Prescriptions     Pending Prescriptions Disp Refills    semaglutide-weight management 1 MG/0.5ML Subcutaneous Solution Auto-injector 2 mL 0     Sig: Inject 0.5 mL (1 mg total) into the skin once a week for 4 doses.      LOV: 05/20/25  RTC: 08/21/25  Filled: 06/20/25 #2 with 0 refills    Future Appointments   Date Time Provider Department Center   7/15/2025  6:15 PM [facility]UAL [facility]ULT [facility] [place]   7/22/2025  6:15 PM [facility] [place]   7/29/2025  6:15 PM [facility] [place]   8/5/2025  6:15 PM [facility] [place]   8/12/2025  6:15 PM [facility] [place]   8/19/2025  6:15 PM [facility] [place]   8/21/2025  8:00 AM Alesha Johnston APN [facility] [address]   8/26/2025  6:15 PM [facility] [place]   9/9/2025  6:00 PM Riri Aguilar APRN [id number]   11/20/2025  8:00 AM Alesha Johnston APN [facility]

## 2025-08-08 ENCOUNTER — PATIENT MESSAGE (OUTPATIENT)
Dept: INTERNAL MEDICINE CLINIC | Facility: CLINIC | Age: 45
End: 2025-08-08

## 2025-08-11 RX ORDER — MONTELUKAST SODIUM 10 MG/1
10 TABLET ORAL NIGHTLY
Qty: 90 TABLET | Refills: 0 | OUTPATIENT
Start: 2025-08-11

## 2025-08-12 ENCOUNTER — OFFICE VISIT (OUTPATIENT)
Dept: FAMILY MEDICINE CLINIC | Facility: CLINIC | Age: 45
End: 2025-08-12

## 2025-08-12 VITALS
OXYGEN SATURATION: 96 % | DIASTOLIC BLOOD PRESSURE: 78 MMHG | SYSTOLIC BLOOD PRESSURE: 120 MMHG | RESPIRATION RATE: 18 BRPM | TEMPERATURE: 97 F | HEART RATE: 73 BPM

## 2025-08-12 DIAGNOSIS — L05.91 PILONIDAL CYST: ICD-10-CM

## 2025-08-12 DIAGNOSIS — L30.4 INTERTRIGO: Primary | ICD-10-CM

## 2025-08-12 PROCEDURE — 99213 OFFICE O/P EST LOW 20 MIN: CPT | Performed by: NURSE PRACTITIONER

## 2025-08-12 RX ORDER — NYSTATIN 100000 U/G
1 CREAM TOPICAL 2 TIMES DAILY
Qty: 30 G | Refills: 1 | Status: SHIPPED | OUTPATIENT
Start: 2025-08-12 | End: 2025-08-26

## 2025-08-12 RX ORDER — FLUCONAZOLE 150 MG/1
TABLET ORAL
Qty: 2 TABLET | Refills: 0 | Status: SHIPPED | OUTPATIENT
Start: 2025-08-12

## 2025-08-13 RX ORDER — FLUTICASONE PROPIONATE 110 UG/1
1 AEROSOL, METERED RESPIRATORY (INHALATION) 2 TIMES DAILY
Qty: 36 G | Refills: 3 | Status: SHIPPED | OUTPATIENT
Start: 2025-08-13

## 2025-08-13 RX ORDER — ALBUTEROL SULFATE 90 UG/1
2 INHALANT RESPIRATORY (INHALATION) EVERY 4 HOURS PRN
Qty: 3 EACH | Refills: 3 | Status: SHIPPED | OUTPATIENT
Start: 2025-08-13

## 2025-08-13 RX ORDER — CLOTRIMAZOLE AND BETAMETHASONE DIPROPIONATE 10; .64 MG/G; MG/G
1 CREAM TOPICAL 2 TIMES DAILY
Qty: 60 G | Refills: 3 | Status: SHIPPED | OUTPATIENT
Start: 2025-08-13

## 2025-08-14 DIAGNOSIS — I10 HYPERTENSION WITH GOAL BLOOD PRESSURE LESS THAN 140/90: ICD-10-CM

## 2025-08-14 DIAGNOSIS — G47.33 OSA (OBSTRUCTIVE SLEEP APNEA): ICD-10-CM

## 2025-08-14 DIAGNOSIS — G43.009 MIGRAINE WITHOUT AURA AND WITHOUT STATUS MIGRAINOSUS, NOT INTRACTABLE: ICD-10-CM

## 2025-08-14 DIAGNOSIS — R73.03 PREDIABETES: ICD-10-CM

## 2025-08-14 DIAGNOSIS — F41.1 GENERALIZED ANXIETY DISORDER: ICD-10-CM

## 2025-08-14 DIAGNOSIS — Z51.81 ENCOUNTER FOR THERAPEUTIC DRUG MONITORING: ICD-10-CM

## 2025-08-14 RX ORDER — SEMAGLUTIDE 1 MG/.5ML
1 INJECTION, SOLUTION SUBCUTANEOUS
Qty: 2 ML | Refills: 0 | OUTPATIENT
Start: 2025-08-14

## 2025-08-20 ENCOUNTER — TELEPHONE (OUTPATIENT)
Facility: CLINIC | Age: 45
End: 2025-08-20

## 2025-08-20 DIAGNOSIS — N20.0 KIDNEY STONE: Primary | ICD-10-CM

## 2025-08-20 DIAGNOSIS — K59.00 CONSTIPATION, UNSPECIFIED CONSTIPATION TYPE: ICD-10-CM

## 2025-08-27 ENCOUNTER — TELEMEDICINE (OUTPATIENT)
Dept: INTERNAL MEDICINE CLINIC | Facility: CLINIC | Age: 45
End: 2025-08-27

## 2025-08-27 DIAGNOSIS — R73.03 PREDIABETES: ICD-10-CM

## 2025-08-27 DIAGNOSIS — F41.1 GENERALIZED ANXIETY DISORDER: ICD-10-CM

## 2025-08-27 DIAGNOSIS — Z51.81 ENCOUNTER FOR THERAPEUTIC DRUG MONITORING: Primary | ICD-10-CM

## 2025-08-27 DIAGNOSIS — G43.009 MIGRAINE WITHOUT AURA AND WITHOUT STATUS MIGRAINOSUS, NOT INTRACTABLE: ICD-10-CM

## 2025-08-27 DIAGNOSIS — G47.33 OSA (OBSTRUCTIVE SLEEP APNEA): ICD-10-CM

## 2025-08-27 DIAGNOSIS — I10 HYPERTENSION WITH GOAL BLOOD PRESSURE LESS THAN 140/90: ICD-10-CM

## 2025-08-27 PROCEDURE — 98005 SYNCH AUDIO-VIDEO EST LOW 20: CPT | Performed by: NURSE PRACTITIONER

## (undated) NOTE — LETTER
Patient Name: Oneyda Pedraza        : 1980       Medical Record #: [id number]    CONSENT FOR PROCEDURES/SEDATION    Date: 2018       Time: 4:48 PM        1.  I authorize the performance upon Latosha London the following:   IUD removal  _________

## (undated) NOTE — ED AVS SNAPSHOT
[facility] Emergency Department    [address]     [place] [address]    Phone:  [phone]    Fax:  [phone]           Brian Eulogio   MRN: [id number]    Department:  [facility] Emergency Department   Date of Visit:  6/2/2017 and Class Registration line at [phone] or find a doctor online by visiting [email or web address].    IF THERE IS ANY CHANGE OR WORSENING OF YOUR CONDITION, CALL YOUR PRIMARY CARE PHYSICIAN AT ONCE OR RETURN IMMEDIATELY TO [address] [place] [address].     If

## (undated) NOTE — LETTER
Date & Time: 8/1/2019, 1:28 PM  Patient: Jonny London  Encounter Provider(s):    Jameson Salinas MD       To Whom It May Concern:    Jennifer Benites was seen and treated in our department on 8/1/2019. She IS RECOMMENDED TO REST AT HOME FOR NEXT 3 DAYS.

## (undated) NOTE — LETTER
Date: 9/9/2018    Patient Name: Hector Blake          To Whom it may concern: This letter has been written at the patient's request. The above patient was seen at the [facility] for treatment of a medical condition.     This patient has

## (undated) NOTE — LETTER
Date: 2/25/2025    Patient Name: Latosha London          To Whom it may concern:    The above patient was seen at [facility] for treatment of a medical condition.    This patient should be excused from attending work until fever free for 24 hours with no fever reducer and respiratory symptoms are mostly improved per patient report.    The patient is expected to return to work on or around 2/28/25 with no limitations.        Sincerely,        SHIRA Barkley

## (undated) NOTE — LETTER
Date & Time: 11/26/2023, 4:11 PM  Patient: Felipa London  Encounter Provider(s):    FERNANDA Castro       To Whom It May Concern:    Supriya Almanza was seen and treated in our department on 11/26/2023. Patient will return to work on Tuesday if feeling better and fever free.     If you have any questions or concerns, please do not hesitate to call.        _____________________________  Physician/APC Signature

## (undated) NOTE — LETTER
Latosha London, :1980    CONSENT FOR PROCEDURE/SEDATION    1. I authorize the performance upon Latosha London  the following: Colposcopy    2.  I authorize Dr. Rosangela Max MD (and whomever is designated as the doctor’s assistant), to Witness: _________________________________________ Date:___________     Physician Signature: _______________________________ Date:___________

## (undated) NOTE — LETTER
Date: 12/4/2023    Patient Name: Naz Moran          To Whom it may concern: The above patient was seen at the [facility] for treatment of a medical condition. This patient should be excused from attending work 11/28 through 12/1 due to health condition. The patient may return to work on 12/4 without restrictions.         Sincerely,    Beronica Arndt, DO

## (undated) NOTE — Clinical Note
Bushra Ochoa - I saw Mahi Francisco today with nighttime voiding complaints. I've recommended bowel management. I will work to manage her sx. I appreciate the opportunity to participate in her care.  Thanks, [employer]

## (undated) NOTE — LETTER
Latosha London, :1980    CONSENT FOR PROCEDURE/SEDATION    1.  I authorize the performance upon Latosha London  the following: COLPOSCOPY    2. I authorize Dr. Lolita Starks MD (and whomever is designated as the doctor’s assistant), to Witness: _________________________________________ Date:___________     Physician Signature: _______________________________ Date:___________

## (undated) NOTE — ED AVS SNAPSHOT
Jeanmarie Leiva   MRN: [id number]    Department:  [facility] Emergency Department   Date of Visit:  7/23/2019           Disclosure     Insurance plans vary and the physician(s) referred by the ER may not be covered by your plan.  Please contact yo CARE PHYSICIAN AT ONCE OR RETURN IMMEDIATELY TO THE EMERGENCY DEPARTMENT. If you have been prescribed any medication(s), please fill your prescription right away and begin taking the medication(s) as directed.   If you believe that any of the medications

## (undated) NOTE — LETTER
12/9/2023      To Whom It May Concern:    Navarro Reeves is currently under my medical care. Please excuse Makayla Suyapa for the following dates: December 5, December 6 and December 8. If you require additional information please contact our office.       Sincerely,    Felisha Patton, [address] [address]   [phone]      Document generated by:  Shari Gill RN

## (undated) NOTE — LETTER
Latosha Lodnon, :1980    CONSENT FOR PROCEDURE/SEDATION    1. I authorize the performance upon Latosha London  the following: LEEP    2.  I authorize Dr. Delfin Anaya MD (and whomever is designated as the doctor’s assistant), to Kvng Ghoshmaryann Witness: _________________________________________ Date:___________     Physician Signature: _______________________________ Date:___________

## (undated) NOTE — LETTER
Latosha London, :1980    CONSENT FOR PROCEDURE/SEDATION    1. I authorize the performance upon Latosha London  the following: Mirena IUD Insertion    2.  I authorize Dr. Neil Harris MD (and whomever is designated as the doctor’s sylvester Relationship to patient: ____________________________________________    Witness: _________________________________________ Date:___________     Physician Signature: _______________________________ Date:___________

## (undated) NOTE — LETTER
[address]  Dept: [phone]  Dr. Marcelo Lucas, DO      2019    RE: Jessica Hagan  : 1980      To Whom It May ConcernNORA

## (undated) NOTE — LETTER
:  1980      To Whom It May Concern:    Hallie Heck is currently under my medical care and may not return to work at this time. Please excuse Amee Hairston for 5 days. She may return to work on 2022. Activity is restricted as follows: none. If this office may be of further assistance, please do not hesitate to contact us.       Sincerely,        Russel Alvarenga, DO

## (undated) NOTE — ED AVS SNAPSHOT
[facility] Emergency Department    [address]     [place] [address]    Phone:  [phone]    Fax:  [phone]           Marsha Jefferson   MRN: [id number]    Department:  [facility] Emergency Department   Date of Visit:  6/2/2017 HEADACHE, MIGRAINE, CLASSIC (ENGLISH)    VOMITING (ADULT) (ENGLISH)      Disclosure     Insurance plans vary and the physician(s) referred by the ER may not be covered by your plan.  Please contact your insurance company to determine coverage and benefits If you have been prescribed any medication(s), please fill your prescription right away and begin taking the medication(s) as directed.   If you believe that any of the medications or instructions on this list is different from what your Primary Care doctor - If you don’t have insurance, [facility] has partnered with [employer] [facility] to help you get signed up for insurance coverage.   [employer] [facility] is a Federal [employer]igator program that can help with your Affordable Care Act cover